# Patient Record
Sex: FEMALE | Race: WHITE | NOT HISPANIC OR LATINO | Employment: STUDENT | URBAN - METROPOLITAN AREA
[De-identification: names, ages, dates, MRNs, and addresses within clinical notes are randomized per-mention and may not be internally consistent; named-entity substitution may affect disease eponyms.]

---
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
## 2021-01-28 ENCOUNTER — OFFICE VISIT (OUTPATIENT)
Dept: DERMATOLOGY | Facility: CLINIC | Age: 21
End: 2021-01-28
Payer: COMMERCIAL

## 2021-01-28 VITALS — SYSTOLIC BLOOD PRESSURE: 115 MMHG | HEART RATE: 93 BPM | DIASTOLIC BLOOD PRESSURE: 71 MMHG | RESPIRATION RATE: 16 BRPM

## 2021-01-28 DIAGNOSIS — L91.0 KELOID: Primary | ICD-10-CM

## 2021-01-28 PROCEDURE — 99212 OFFICE O/P EST SF 10 MIN: CPT | Mod: 25 | Performed by: PHYSICIAN ASSISTANT

## 2021-01-28 PROCEDURE — 11900 INJECT SKIN LESIONS </W 7: CPT | Performed by: PHYSICIAN ASSISTANT

## 2021-01-28 NOTE — PROGRESS NOTES
Rafaela Mckeon is an extremely pleasant 20 year old year old female patient here today for spot on right shoulder. Present for years, getting larger. She does have a history of scarring with procedures. Does recall any injury.  Patient has no other skin complaints today.  Remainder of the HPI, Meds, PMH, Allergies, FH, and SH was reviewed in chart.    Past Medical History:   Diagnosis Date     Foreign-Danlos syndrome      Postural orthostatic tachycardia syndrome        Past Surgical History:   Procedure Laterality Date     CHOLECYSTECTOMY          Family History   Problem Relation Age of Onset     Allergies Mother         Seasonal     Allergies Sister         Seasonal       Social History     Socioeconomic History     Marital status: Single     Spouse name: Not on file     Number of children: Not on file     Years of education: Not on file     Highest education level: Not on file   Occupational History     Not on file   Social Needs     Financial resource strain: Not on file     Food insecurity     Worry: Not on file     Inability: Not on file     Transportation needs     Medical: Not on file     Non-medical: Not on file   Tobacco Use     Smoking status: Never Smoker     Smokeless tobacco: Never Used   Substance and Sexual Activity     Alcohol use: No     Drug use: No     Sexual activity: Never   Lifestyle     Physical activity     Days per week: Not on file     Minutes per session: Not on file     Stress: Not on file   Relationships     Social connections     Talks on phone: Not on file     Gets together: Not on file     Attends Hoahaoism service: Not on file     Active member of club or organization: Not on file     Attends meetings of clubs or organizations: Not on file     Relationship status: Not on file     Intimate partner violence     Fear of current or ex partner: Not on file     Emotionally abused: Not on file     Physically abused: Not on file     Forced sexual activity: Not on file   Other Topics  Concern     Not on file   Social History Narrative    January 7, 2020    ENVIRONMENTAL HISTORY: [place] The home is heated with a electric furnace. They do not have central air conditioning. The patient's bedroom is furnished with carpeting in bedroom and fabric window coverings.  Pets inside the house include cats, dog and rabbits. There is no history of cockroach or mice infestation. There are no smokers in the house.  The house does not have a damp basement.        Outpatient Encounter Medications as of 1/28/2021   Medication Sig Dispense Refill     albuterol (PROAIR HFA/PROVENTIL HFA/VENTOLIN HFA) 108 (90 Base) MCG/ACT inhaler Inhale 2-4 puffs into the lungs every 4 hours as needed for shortness of breath / dyspnea or wheezing 18 g 3     benzoyl peroxide 5 % external liquid Use daily as directed, wash face daily 113 g 11     doxycycline monohydrate (MONODOX) 100 MG capsule Take 1 capsule (100 mg) by mouth daily 60 capsule 3     etonogestrel (NEXPLANON) 68 MG IMPL Nexplanon 68 mg subdermal implant   Inject 1 implant by subcutaneous route.       fluocinonide (LIDEX) 0.05 % external solution Apply topically 2 times daily 120 mL 11     gabapentin (NEURONTIN) 300 MG capsule Take 300 mg by mouth 2 times daily        hydrOXYzine (VISTARIL) 25 MG capsule Take 25 mg by mouth 3 times daily as needed for anxiety        ketorolac (TORADOL) 10 MG tablet Take 10 mg by mouth every 6 hours as needed for pain        ondansetron (ZOFRAN-ODT) 4 MG ODT tab Take 4 mg by mouth       prazosin (MINIPRESS) 5 MG capsule        QUEtiapine (SEROQUEL XR) 50 MG TB24 24 hr tablet quetiapine ER 50 mg tablet,extended release 24 hr       tretinoin (RETIN-A) 0.05 % external cream Apply topically At Bedtime 45 g 3     venlafaxine (EFFEXOR) 37.5 MG tablet Take 225 mg by mouth daily        azelastine (ASTELIN) 0.1 % nasal spray Spray 2 sprays into both nostrils 2 times daily as needed for rhinitis  (Patient not taking: Reported on 6/9/2020) 30 mL 3     azelastine (OPTIVAR) 0.05 % ophthalmic solution Apply 1 drop to eye 2 times daily as needed (itchy/watery eyes) (Patient not taking: Reported on 6/9/2020) 6 mL 3     cyclobenzaprine 5 MG PO tablet Take 1 tablet (5 mg) by mouth 2 times daily as needed for muscle spasms (Patient not taking: Reported on 6/9/2020) 45 tablet 0     fluticasone (ARNUITY ELLIPTA) 100 MCG/ACT inhaler Inhale 1 puff into the lungs daily (Patient not taking: Reported on 6/9/2020) 30 each 3     fluticasone (FLONASE) 50 MCG/ACT nasal spray Spray 1-2 sprays into both nostrils daily (Patient not taking: Reported on 6/9/2020) 16 g 3     ORDER FOR DME Equipment being ordered: Wrist brace Left (Patient not taking: Reported on 2/21/2020) 1 Units 0     oxyCODONE-acetaminophen (PERCOCET) 5-325 MG tablet Take 1-2 tablets by mouth every 4 hours as needed for breakthrough pain (Patient not taking: Reported on 9/23/2020) 4 tablet 0     No facility-administered encounter medications on file as of 1/28/2021.              Review Of Systems  Skin: As above  Eyes: negative  Ears/Nose/Throat: negative  Respiratory: No shortness of breath, dyspnea on exertion, cough      O:   NAD, WDWN, Alert & Oriented, Mood & Affect wnl, Vitals stable   Here today alone   /71 (BP Location: Left arm, Patient Position: Sitting, Cuff Size: Adult Regular)   Pulse 93   Resp 16    General appearance normal   Vitals stable   Alert, oriented and in no acute distress     Firm spongy pink nodule on right posterior shoulder   Keloids from previous scars on abdomen       Eyes: Conjunctivae/lids:Normal     ENT: Lips: normal    MSK:Normal    Cardiovascular: peripheral edema none    Pulm: Breathing Normal    Lymph Nodes: No Head and Neck Lymphadenopathy     Neuro/Psych: Orientation:Alert and Orientedx3 ; Mood/Affect:normal   A/P:  1. Consistent with keloid  IL TAC: PGACAC discussed.  Risks including but not limited to injection site  reaction, bruising, no resolution.  All questions answered and entertained to patient s satisfaction.  Informed consent obtained.  IL TAC in concentration of 20mg/ml was injected ID to keloid.  Total injected was 0.1 ml.  Patient tolerated without complications and given wound care instructions, including not to move product around.  Return in 4 weeks for follow-up and possible additional IL TAC.    If not improving, see Dr. Mary to see if excision is warranted.

## 2021-01-28 NOTE — LETTER
1/28/2021         RE: Rafaela Mckeon  [id number] [place] [address]        Dear Colleague,    Thank you for referring your patient, Rafaela Mckeon, to the [facility]. Please see a copy of my visit note below.    Rafaela Mckeon is an extremely pleasant 20 year old year old female patient here today for spot on right shoulder. Present for years, getting larger. She does have a history of scarring with procedures. Does recall any injury.  Patient has no other skin complaints today.  Remainder of the HPI, Meds, PMH, Allergies, FH, and SH was reviewed in chart.    Past Medical History:   Diagnosis Date     Foreign-Danlos syndrome      Postural orthostatic tachycardia syndrome        Past Surgical History:   Procedure Laterality Date     CHOLECYSTECTOMY          Family History   Problem Relation Age of Onset     Allergies Mother         Seasonal     Allergies Sister         Seasonal       Social History     Socioeconomic History     Marital status: Single     Spouse name: Not on file     Number of children: Not on file     Years of education: Not on file     Highest education level: Not on file   Occupational History     Not on file   Social Needs     Financial resource strain: Not on file     Food insecurity     Worry: Not on file     Inability: Not on file     Transportation needs     Medical: Not on file     Non-medical: Not on file   Tobacco Use     Smoking status: Never Smoker     Smokeless tobacco: Never Used   Substance and Sexual Activity     Alcohol use: No     Drug use: No     Sexual activity: Never   Lifestyle     Physical activity     Days per week: Not on file     Minutes per session: Not on file     Stress: Not on file   Relationships     Social connections     Talks on phone: Not on file     Gets together: Not on file     Attends Restorationist service: Not on file     Active member of club or organization: Not on file     Attends meetings of clubs or organizations: Not on file      Relationship status: Not on file     Intimate partner violence     Fear of current or ex partner: Not on file     Emotionally abused: Not on file     Physically abused: Not on file     Forced sexual activity: Not on file   Other Topics Concern     Not on file   Social History Narrative    January 7, 2020    ENVIRONMENTAL HISTORY: [place] home is heated with a electric furnace. They do not have central air conditioning. The patient's bedroom is furnished with carpeting in bedroom and fabric window coverings.  Pets inside the house include cats, dog and rabbits. There is no history of cockroach or mice infestation. There are no smokers in the house.  The house does not have a damp basement.        Outpatient Encounter Medications as of 1/28/2021   Medication Sig Dispense Refill     albuterol (PROAIR HFA/PROVENTIL HFA/VENTOLIN HFA) 108 (90 Base) MCG/ACT inhaler Inhale 2-4 puffs into the lungs every 4 hours as needed for shortness of breath / dyspnea or wheezing 18 g 3     benzoyl peroxide 5 % external liquid Use daily as directed, wash face daily 113 g 11     doxycycline monohydrate (MONODOX) 100 MG capsule Take 1 capsule (100 mg) by mouth daily 60 capsule 3     etonogestrel (NEXPLANON) 68 MG IMPL Nexplanon 68 mg subdermal implant   Inject 1 implant by subcutaneous route.       fluocinonide (LIDEX) 0.05 % external solution Apply topically 2 times daily 120 mL 11     gabapentin (NEURONTIN) 300 MG capsule Take 300 mg by mouth 2 times daily        hydrOXYzine (VISTARIL) 25 MG capsule Take 25 mg by mouth 3 times daily as needed for anxiety        ketorolac (TORADOL) 10 MG tablet Take 10 mg by mouth every 6 hours as needed for pain        ondansetron (ZOFRAN-ODT) 4 MG ODT tab Take 4 mg by mouth       prazosin (MINIPRESS) 5 MG capsule        QUEtiapine (SEROQUEL XR) 50 MG TB24 24 hr tablet quetiapine ER 50 mg tablet,extended release 24 hr       tretinoin (RETIN-A) 0.05 %  external cream Apply topically At Bedtime 45 g 3     venlafaxine (EFFEXOR) 37.5 MG tablet Take 225 mg by mouth daily        azelastine (ASTELIN) 0.1 % nasal spray Spray 2 sprays into both nostrils 2 times daily as needed for rhinitis (Patient not taking: Reported on 6/9/2020) 30 mL 3     azelastine (OPTIVAR) 0.05 % ophthalmic solution Apply 1 drop to eye 2 times daily as needed (itchy/watery eyes) (Patient not taking: Reported on 6/9/2020) 6 mL 3     cyclobenzaprine 5 MG PO tablet Take 1 tablet (5 mg) by mouth 2 times daily as needed for muscle spasms (Patient not taking: Reported on 6/9/2020) 45 tablet 0     fluticasone (ARNUITY ELLIPTA) 100 MCG/ACT inhaler Inhale 1 puff into the lungs daily (Patient not taking: Reported on 6/9/2020) 30 each 3     fluticasone (FLONASE) 50 MCG/ACT nasal spray Spray 1-2 sprays into both nostrils daily (Patient not taking: Reported on 6/9/2020) 16 g 3     ORDER FOR DME Equipment being ordered: Wrist brace Left (Patient not taking: Reported on 2/21/2020) 1 Units 0     oxyCODONE-acetaminophen (PERCOCET) 5-325 MG tablet Take 1-2 tablets by mouth every 4 hours as needed for breakthrough pain (Patient not taking: Reported on 9/23/2020) 4 tablet 0     No facility-administered encounter medications on file as of 1/28/2021.              Review Of Systems  Skin: As above  Eyes: negative  Ears/Nose/Throat: negative  Respiratory: No shortness of breath, dyspnea on exertion, cough      O:   NAD, WDWN, Alert & Oriented, Mood & Affect wnl, Vitals stable   Here today alone   /71 (BP Location: Left arm, Patient Position: Sitting, Cuff Size: Adult Regular)   Pulse 93   Resp 16    General appearance normal   Vitals stable   Alert, oriented and in no acute distress     Firm spongy pink nodule on right posterior shoulder   Keloids from previous scars on abdomen       Eyes: Conjunctivae/lids:Normal     ENT: Lips: normal    MSK:Normal    Cardiovascular: peripheral edema none    Pulm: Breathing  Normal    Lymph Nodes: No Head and Neck Lymphadenopathy     Neuro/Psych: Orientation:Alert and Orientedx3 ; Mood/Affect:normal   A/P:  1. Consistent with keloid  IL TAC: PGACAC discussed.  Risks including but not limited to injection site reaction, bruising, no resolution.  All questions answered and entertained to patient s satisfaction.  Informed consent obtained.  IL TAC in concentration of 20mg/ml was injected ID to keloid.  Total injected was 0.1 ml.  Patient tolerated without complications and given wound care instructions, including not to move product around.  Return in 4 weeks for follow-up and possible additional IL TAC.    If not improving, see Dr. Mary to see if excision is warranted.       Again, thank you for allowing me to participate in the care of your patient.        Sincerely,        Marianela Rasmussen PA-C

## 2021-01-28 NOTE — NURSING NOTE
"Initial /71 (BP Location: Left arm, Patient Position: Sitting, Cuff Size: Adult Regular)   Pulse 93   Resp 16  Estimated body mass index is 20.25 kg/m  as calculated from the following:    Height as of 12/14/20: 1.626 m (5' 4\").    Weight as of 10/24/20: 53.5 kg (118 lb). .    Rosamaria Gonsales, [facility]    "

## 2021-02-10 NOTE — PROGRESS NOTES
Outpatient Physical Therapy Discharge Note     Patient: Rafaela Mckeon  : 2000    Beginning/End Dates of Reporting Period:  20 to 12/15/20    Referring Provider: Thor Stone Diagnosis: decreased ROM and strength L ankle      Client Self Report: had her f/u with the doctor and said she can wean out of the brace. Has not been wearing the brace inside and it's been feeling pretty good. Not too much for soreness anymore but does feel weak still.     Objective Measurements:  Objective Measure: ankle ROM   Details: 10-65 no pain, mild pain at end range inversion over ATFL     Goals:  Goal Identifier 1   Goal Description Patient will improve left ankle DF to 5 degrees in order to help normalize gait pattern.    Target Date 20   Date Met      Progress:     Goal Identifier 2   Goal Description patient will be able to ambulate with non antalgic gait pattern on the left.    Target Date 20   Date Met      Progress:     Goal Identifier 3   Goal Description Patient will be able to complete SL stance x 30 seconds on the left.    Target Date 21   Date Met      Progress:     Goal Identifier 4   Goal Description Patient will be able to walk for 30 minutes without increased left ankle pain.    Target Date 21   Date Met      Progress:     Goal Identifier 5   Goal Description Patient will be able to complete 10 single leg heel raises on left in order to show improved strength for daily activities and work.    Target Date 21   Date Met      Progress:       Progress towards Goals:   Progress this reporting period: All information listed above was at patient's last attended PT visit. At her last attended session, patient was Progressing to higher level of repetitions and blue band today. Okayed by doctor to take brace off for walking now. Still feels unsteady and weak with balance exercises.  Despite improvements, pt has failed to schedule f/u visits within 30 days from last visit  thus is being d/c from therapy at this time. Objective measures are all taken from last visit. Current status is unknown at this time.    Plan:  Discharge from therapy.    Discharge:    Reason for Discharge: Patient has failed to schedule further appointments.    Equipment Issued: none    Discharge Plan:  Not completed since patient failed to schedule further appointments.    Savannah Renteria  PT, DPT       2/10/2021   [address] [address]  [email or web address]  Voicemail: [phone]

## 2021-06-18 ENCOUNTER — OFFICE VISIT (OUTPATIENT)
Dept: DERMATOLOGY | Facility: CLINIC | Age: 21
End: 2021-06-18
Payer: COMMERCIAL

## 2021-06-18 VITALS — HEART RATE: 94 BPM | DIASTOLIC BLOOD PRESSURE: 67 MMHG | SYSTOLIC BLOOD PRESSURE: 110 MMHG | OXYGEN SATURATION: 98 %

## 2021-06-18 DIAGNOSIS — L70.0 ACNE VULGARIS: ICD-10-CM

## 2021-06-18 DIAGNOSIS — L73.8 PITYROSPORUM FOLLICULITIS: Primary | ICD-10-CM

## 2021-06-18 DIAGNOSIS — L21.9 DERMATITIS, SEBORRHEIC: ICD-10-CM

## 2021-06-18 PROCEDURE — 99213 OFFICE O/P EST LOW 20 MIN: CPT | Performed by: PHYSICIAN ASSISTANT

## 2021-06-18 RX ORDER — CICLOPIROX OLAMINE 7.7 MG/G
CREAM TOPICAL
Qty: 30 G | Refills: 5 | Status: SHIPPED | OUTPATIENT
Start: 2021-06-18 | End: 2024-04-08

## 2021-06-18 RX ORDER — DOXYCYCLINE 100 MG/1
100 CAPSULE ORAL DAILY
Qty: 60 CAPSULE | Refills: 0 | Status: ON HOLD | OUTPATIENT
Start: 2021-06-18 | End: 2021-09-15

## 2021-06-18 NOTE — LETTER
6/18/2021         RE: Rafaela Mckeon  [address] [address]        Dear Colleague,    Thank you for referring your patient, Rafaela Mckeon, to the [facility]. Please see a copy of my visit note below.    Rafaela Mckeon is an extremely pleasant 20 year old year old female patient here today for recheck and and seborrheic dermatitis. She notes acne has improved on doxycycline, bpo wash. She notes that it just recently flared, smith small little bumps on shoulders, face. She notes seborrheic dermatitis has improved on scalp with lidex solution. Patient has no other skin complaints today.  Remainder of the HPI, Meds, PMH, Allergies, FH, and SH was reviewed in chart.    Past Medical History:   Diagnosis Date     Foreign-Danlos syndrome      Postural orthostatic tachycardia syndrome        Past Surgical History:   Procedure Laterality Date     CHOLECYSTECTOMY          Family History   Problem Relation Age of Onset     Allergies Mother         Seasonal     Allergies Sister         Seasonal       Social History     Socioeconomic History     Marital status: Single     Spouse name: Not on file     Number of children: Not on file     Years of education: Not on file     Highest education level: Not on file   Occupational History     Not on file   Social Needs     Financial resource strain: Not on file     Food insecurity     Worry: Not on file     Inability: Not on file     Transportation needs     Medical: Not on file     Non-medical: Not on file   Tobacco Use     Smoking status: Never Smoker     Smokeless tobacco: Never Used   Substance and Sexual Activity     Alcohol use: No     Drug use: No     Sexual activity: Never   Lifestyle     Physical activity     Days per week: Not on file     Minutes per session: Not on file     Stress: Not on file   Relationships     Social connections     Talks on phone: Not on file     Gets together: Not on file     Attends Confucianist service: Not on file      Active member of club or organization: Not on file     Attends meetings of clubs or organizations: Not on file     Relationship status: Not on file     Intimate partner violence     Fear of current or ex partner: Not on file     Emotionally abused: Not on file     Physically abused: Not on file     Forced sexual activity: Not on file   Other Topics Concern     Not on file   Social History Narrative    January 7, 2020    ENVIRONMENTAL HISTORY: [place] The home is heated with a electric furnace. They do not have central air conditioning. The patient's bedroom is furnished with carpeting in bedroom and fabric window coverings.  Pets inside the house include cats, dog and rabbits. There is no history of cockroach or mice infestation. There are no smokers in the house.  The house does not have a damp basement.        Outpatient Encounter Medications as of 6/18/2021   Medication Sig Dispense Refill     albuterol (PROAIR HFA/PROVENTIL HFA/VENTOLIN HFA) 108 (90 Base) MCG/ACT inhaler Inhale 2-4 puffs into the lungs every 4 hours as needed for shortness of breath / dyspnea or wheezing 18 g 3     ciclopirox (LOPROX) 0.77 % cream Apply daily to face. 30 g 5     doxycycline monohydrate (MONODOX) 100 MG capsule Take 1 capsule (100 mg) by mouth daily 60 capsule 0     etonogestrel (NEXPLANON) 68 MG IMPL Nexplanon 68 mg subdermal implant   Inject 1 implant by subcutaneous route.       fluocinonide (LIDEX) 0.05 % external solution Apply topically 2 times daily 120 mL 11     gabapentin (NEURONTIN) 300 MG capsule Take 300 mg by mouth 2 times daily        hydrOXYzine (VISTARIL) 25 MG capsule Take 25 mg by mouth 3 times daily as needed for anxiety        prazosin (MINIPRESS) 5 MG capsule        QUEtiapine (SEROQUEL XR) 50 MG TB24 24 hr tablet quetiapine ER 50 mg tablet,extended release 24 hr       tretinoin (RETIN-A) 0.05 % external cream Apply topically At Bedtime 45 g 3     venlafaxine  (EFFEXOR) 37.5 MG tablet Take 225 mg by mouth daily        azelastine (ASTELIN) 0.1 % nasal spray Spray 2 sprays into both nostrils 2 times daily as needed for rhinitis (Patient not taking: Reported on 6/9/2020) 30 mL 3     azelastine (OPTIVAR) 0.05 % ophthalmic solution Apply 1 drop to eye 2 times daily as needed (itchy/watery eyes) (Patient not taking: Reported on 6/9/2020) 6 mL 3     benzoyl peroxide 5 % external liquid Use daily as directed, wash face daily (Patient not taking: Reported on 6/18/2021) 113 g 11     cyclobenzaprine 5 MG PO tablet Take 1 tablet (5 mg) by mouth 2 times daily as needed for muscle spasms (Patient not taking: Reported on 6/9/2020) 45 tablet 0     fluticasone (ARNUITY ELLIPTA) 100 MCG/ACT inhaler Inhale 1 puff into the lungs daily (Patient not taking: Reported on 6/9/2020) 30 each 3     fluticasone (FLONASE) 50 MCG/ACT nasal spray Spray 1-2 sprays into both nostrils daily (Patient not taking: Reported on 6/9/2020) 16 g 3     ketorolac (TORADOL) 10 MG tablet Take 10 mg by mouth every 6 hours as needed for pain        ondansetron (ZOFRAN-ODT) 4 MG ODT tab Take 4 mg by mouth       [DISCONTINUED] doxycycline monohydrate (MONODOX) 100 MG capsule Take 1 capsule (100 mg) by mouth daily 60 capsule 3     [DISCONTINUED] ORDER FOR DME Equipment being ordered: Wrist brace Left (Patient not taking: Reported on 2/21/2020) 1 Units 0     [DISCONTINUED] oxyCODONE-acetaminophen (PERCOCET) 5-325 MG tablet Take 1-2 tablets by mouth every 4 hours as needed for breakthrough pain (Patient not taking: Reported on 9/23/2020) 4 tablet 0     No facility-administered encounter medications on file as of 6/18/2021.              O:   NAD, WDWN, Alert & Oriented, Mood & Affect wnl, Vitals stable   Here today alone   /67 (BP Location: Right arm, Patient Position: Sitting, Cuff Size: Adult Regular)   Pulse 94   SpO2 98%    General appearance normal   Vitals stable   Alert, oriented and in no acute  distress     Monomorphic inflammatory papules, pustules on forehead       Eyes: Conjunctivae/lids:Normal     ENT: Lips: normal    MSK:Normal    Pulm: Breathing Normal    Neuro/Psych: Orientation:Alert and Orientedx3 ; Mood/Affect:normal  A/P  1.  Acne Vulgaris  Continue bpo wash.   Doxycycline daily with food for next two months, then try to stop.   Apply tretinoin in the evening.   2. Pityrosporum folliculitis   Apply ciclopirox cream daily in the morning to face.   4. Seborrheic dermatitis   Continue lidex if needed.       Again, thank you for allowing me to participate in the care of your patient.        Sincerely,        Marianela Rasmussen PA-C

## 2021-06-18 NOTE — NURSING NOTE
Chief Complaint   Patient presents with     Acne     f/u        Vitals:    06/18/21 1136   BP: 110/67   BP Location: Right arm   Patient Position: Sitting   Cuff Size: Adult Regular   Pulse: 94   SpO2: 98%     Wt Readings from Last 1 Encounters:   10/24/20 53.5 kg (118 lb) (32 %, Z= -0.46)*     * Growth percentiles are based on CDC (Girls, 2-20 Years) data.       Francisca Martins LPN .................6/18/2021

## 2021-06-21 NOTE — PROGRESS NOTES
Rafaela Mckeon is an extremely pleasant 20 year old year old female patient here today for recheck and and seborrheic dermatitis. She notes acne has improved on doxycycline, bpo wash. She notes that it just recently flared, smith small little bumps on shoulders, face. She notes seborrheic dermatitis has improved on scalp with lidex solution. Patient has no other skin complaints today.  Remainder of the HPI, Meds, PMH, Allergies, FH, and SH was reviewed in chart.    Past Medical History:   Diagnosis Date     Foreign-Danlos syndrome      Postural orthostatic tachycardia syndrome        Past Surgical History:   Procedure Laterality Date     CHOLECYSTECTOMY          Family History   Problem Relation Age of Onset     Allergies Mother         Seasonal     Allergies Sister         Seasonal       Social History     Socioeconomic History     Marital status: Single     Spouse name: Not on file     Number of children: Not on file     Years of education: Not on file     Highest education level: Not on file   Occupational History     Not on file   Social Needs     Financial resource strain: Not on file     Food insecurity     Worry: Not on file     Inability: Not on file     Transportation needs     Medical: Not on file     Non-medical: Not on file   Tobacco Use     Smoking status: Never Smoker     Smokeless tobacco: Never Used   Substance and Sexual Activity     Alcohol use: No     Drug use: No     Sexual activity: Never   Lifestyle     Physical activity     Days per week: Not on file     Minutes per session: Not on file     Stress: Not on file   Relationships     Social connections     Talks on phone: Not on file     Gets together: Not on file     Attends Shinto service: Not on file     Active member of club or organization: Not on file     Attends meetings of clubs or organizations: Not on file     Relationship status: Not on file     Intimate partner violence     Fear of current or ex partner: Not on file     Emotionally  abused: Not on file     Physically abused: Not on file     Forced sexual activity: Not on file   Other Topics Concern     Not on file   Social History Narrative    January 7, 2020    ENVIRONMENTAL HISTORY: [place] The home is heated with a electric furnace. They do not have central air conditioning. The patient's bedroom is furnished with carpeting in bedroom and fabric window coverings.  Pets inside the house include cats, dog and rabbits. There is no history of cockroach or mice infestation. There are no smokers in the house.  The house does not have a damp basement.        Outpatient Encounter Medications as of 6/18/2021   Medication Sig Dispense Refill     albuterol (PROAIR HFA/PROVENTIL HFA/VENTOLIN HFA) 108 (90 Base) MCG/ACT inhaler Inhale 2-4 puffs into the lungs every 4 hours as needed for shortness of breath / dyspnea or wheezing 18 g 3     ciclopirox (LOPROX) 0.77 % cream Apply daily to face. 30 g 5     doxycycline monohydrate (MONODOX) 100 MG capsule Take 1 capsule (100 mg) by mouth daily 60 capsule 0     etonogestrel (NEXPLANON) 68 MG IMPL Nexplanon 68 mg subdermal implant   Inject 1 implant by subcutaneous route.       fluocinonide (LIDEX) 0.05 % external solution Apply topically 2 times daily 120 mL 11     gabapentin (NEURONTIN) 300 MG capsule Take 300 mg by mouth 2 times daily        hydrOXYzine (VISTARIL) 25 MG capsule Take 25 mg by mouth 3 times daily as needed for anxiety        prazosin (MINIPRESS) 5 MG capsule        QUEtiapine (SEROQUEL XR) 50 MG TB24 24 hr tablet quetiapine ER 50 mg tablet,extended release 24 hr       tretinoin (RETIN-A) 0.05 % external cream Apply topically At Bedtime 45 g 3     venlafaxine (EFFEXOR) 37.5 MG tablet Take 225 mg by mouth daily        azelastine (ASTELIN) 0.1 % nasal spray Spray 2 sprays into both nostrils 2 times daily as needed for rhinitis (Patient not taking: Reported on 6/9/2020) 30 mL 3     azelastine (OPTIVAR)  0.05 % ophthalmic solution Apply 1 drop to eye 2 times daily as needed (itchy/watery eyes) (Patient not taking: Reported on 6/9/2020) 6 mL 3     benzoyl peroxide 5 % external liquid Use daily as directed, wash face daily (Patient not taking: Reported on 6/18/2021) 113 g 11     cyclobenzaprine 5 MG PO tablet Take 1 tablet (5 mg) by mouth 2 times daily as needed for muscle spasms (Patient not taking: Reported on 6/9/2020) 45 tablet 0     fluticasone (ARNUITY ELLIPTA) 100 MCG/ACT inhaler Inhale 1 puff into the lungs daily (Patient not taking: Reported on 6/9/2020) 30 each 3     fluticasone (FLONASE) 50 MCG/ACT nasal spray Spray 1-2 sprays into both nostrils daily (Patient not taking: Reported on 6/9/2020) 16 g 3     ketorolac (TORADOL) 10 MG tablet Take 10 mg by mouth every 6 hours as needed for pain        ondansetron (ZOFRAN-ODT) 4 MG ODT tab Take 4 mg by mouth       [DISCONTINUED] doxycycline monohydrate (MONODOX) 100 MG capsule Take 1 capsule (100 mg) by mouth daily 60 capsule 3     [DISCONTINUED] ORDER FOR DME Equipment being ordered: Wrist brace Left (Patient not taking: Reported on 2/21/2020) 1 Units 0     [DISCONTINUED] oxyCODONE-acetaminophen (PERCOCET) 5-325 MG tablet Take 1-2 tablets by mouth every 4 hours as needed for breakthrough pain (Patient not taking: Reported on 9/23/2020) 4 tablet 0     No facility-administered encounter medications on file as of 6/18/2021.              O:   NAD, WDWN, Alert & Oriented, Mood & Affect wnl, Vitals stable   Here today alone   /67 (BP Location: Right arm, Patient Position: Sitting, Cuff Size: Adult Regular)   Pulse 94   SpO2 98%    General appearance normal   Vitals stable   Alert, oriented and in no acute distress     Monomorphic inflammatory papules, pustules on forehead       Eyes: Conjunctivae/lids:Normal     ENT: Lips: normal    MSK:Normal    Pulm: Breathing Normal    Neuro/Psych: Orientation:Alert and Orientedx3 ; Mood/Affect:normal  A/P  1.  Acne  Vulgaris  Continue bpo wash.   Doxycycline daily with food for next two months, then try to stop.   Apply tretinoin in the evening.   2. Pityrosporum folliculitis   Apply ciclopirox cream daily in the morning to face.   4. Seborrheic dermatitis   Continue lidex if needed.

## 2021-06-28 ENCOUNTER — HOSPITAL ENCOUNTER (EMERGENCY)
Facility: CLINIC | Age: 21
Discharge: HOME | End: 2021-06-28
Attending: PHYSICIAN ASSISTANT | Admitting: PHYSICIAN ASSISTANT
Payer: COMMERCIAL

## 2021-06-28 VITALS
HEIGHT: 64 IN | SYSTOLIC BLOOD PRESSURE: 121 MMHG | DIASTOLIC BLOOD PRESSURE: 88 MMHG | HEART RATE: 89 BPM | TEMPERATURE: 98.2 F | RESPIRATION RATE: 20 BRPM | OXYGEN SATURATION: 100 % | WEIGHT: 118 LBS | BODY MASS INDEX: 20.14 KG/M2

## 2021-06-28 DIAGNOSIS — T23.262A: ICD-10-CM

## 2021-06-28 DIAGNOSIS — T23.162A SUPERFICIAL BURN OF BACK OF LEFT HAND, INITIAL ENCOUNTER: ICD-10-CM

## 2021-06-28 PROCEDURE — 99213 OFFICE O/P EST LOW 20 MIN: CPT | Performed by: PHYSICIAN ASSISTANT

## 2021-06-28 PROCEDURE — 16020 DRESS/DEBRID P-THICK BURN S: CPT | Performed by: PHYSICIAN ASSISTANT

## 2021-06-28 PROCEDURE — G0463 HOSPITAL OUTPT CLINIC VISIT: HCPCS | Mod: 25 | Performed by: PHYSICIAN ASSISTANT

## 2021-06-28 RX ORDER — MUPIROCIN 20 MG/G
OINTMENT TOPICAL 3 TIMES DAILY
Qty: 22 G | Refills: 0 | Status: SHIPPED | OUTPATIENT
Start: 2021-06-28 | End: 2021-07-08

## 2021-06-28 ASSESSMENT — ENCOUNTER SYMPTOMS
COLOR CHANGE: 1
CARDIOVASCULAR NEGATIVE: 1
RESPIRATORY NEGATIVE: 1
GASTROINTESTINAL NEGATIVE: 1
CONSTITUTIONAL NEGATIVE: 1
WOUND: 1

## 2021-06-28 ASSESSMENT — MIFFLIN-ST. JEOR: SCORE: 1295.24

## 2021-06-28 NOTE — ED PROVIDER NOTES
History     Chief Complaint   Patient presents with     Burn     spilt boiling water on her left arm, no blistering, but is red     HPI  Rafaela Mckeon is a 20 year old female who is presenting with a burn on her left wrist and hand, caused by spilling boiling water on it this afternoon. Currently does not have blistering but has redness at the burn site. The burn does not extend circumferentially.  Burn site is located on the back of the wrist and left hand and around the radial aspect of the wrist as well as on the ring finger.The patient initially rinsed her left arm/hand under lukewarm water for about 30 minutes prior to coming to clinic today.  She also taken Advil with limited relief of pain.  However, applying ice to the affected area has offered moderate relief of pain.  No previous injuries or surgeries to the left hand.  Sensation is are normal and the left upper extremity.  However she has some difficulty clenching her fist due to discomfort in the wrist.    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     Priority: Medium     Severe recurrent major depression without psychotic features (H) 03/02/2019     Priority: Medium     Abnormal hearing screen 10/09/2018     Priority: Medium     Suicidal ideation 02/02/2018     Priority: Medium     Generalized anxiety disorder 09/28/2017     Priority: Medium        Past Medical History:    Past Medical History:   Diagnosis Date     Foreign-Danlos syndrome      Postural orthostatic tachycardia syndrome        Past Surgical History:    Past Surgical History:   Procedure Laterality Date     CHOLECYSTECTOMY         Family History:    Family History   Problem  "Relation Age of Onset     Allergies Mother         Seasonal     Allergies Sister         Seasonal       Social History:  Marital Status:  Single [1]  Social History     Tobacco Use     Smoking status: Never Smoker     Smokeless tobacco: Never Used   Substance Use Topics     Alcohol use: No     Drug use: No        Medications:    albuterol (PROAIR HFA/PROVENTIL HFA/VENTOLIN HFA) 108 (90 Base) MCG/ACT inhaler  azelastine (ASTELIN) 0.1 % nasal spray  azelastine (OPTIVAR) 0.05 % ophthalmic solution  benzoyl peroxide 5 % external liquid  ciclopirox (LOPROX) 0.77 % cream  cyclobenzaprine 5 MG PO tablet  doxycycline monohydrate (MONODOX) 100 MG capsule  etonogestrel (NEXPLANON) 68 MG IMPL  fluocinonide (LIDEX) 0.05 % external solution  fluticasone (ARNUITY ELLIPTA) 100 MCG/ACT inhaler  fluticasone (FLONASE) 50 MCG/ACT nasal spray  gabapentin (NEURONTIN) 300 MG capsule  hydrOXYzine (VISTARIL) 25 MG capsule  ketorolac (TORADOL) 10 MG tablet  ondansetron (ZOFRAN-ODT) 4 MG ODT tab  prazosin (MINIPRESS) 5 MG capsule  QUEtiapine (SEROQUEL XR) 50 MG TB24 24 hr tablet  tretinoin (RETIN-A) 0.05 % external cream  venlafaxine (EFFEXOR) 37.5 MG tablet          Review of Systems   Constitutional: Negative.    HENT: Negative.    Respiratory: Negative.    Cardiovascular: Negative.    Gastrointestinal: Negative.    Genitourinary: Negative.    Skin: Positive for color change and wound. Negative for pallor and rash.          Physical Exam   BP: 121/88  Pulse: 89  Temp: 98.2  F (36.8  C)  Resp: 20  Height: 162.6 cm (5' 4\")  Weight: 53.5 kg (118 lb)  SpO2: 100 %      Physical Exam  Constitutional:       General: She is not in acute distress.     Appearance: Normal appearance. She is not ill-appearing, toxic-appearing or diaphoretic.   Cardiovascular:      Pulses: Normal pulses.           Radial pulses are 2+ on the left side.   Musculoskeletal: Normal range of motion.         General: No tenderness, deformity or signs of injury.   Skin:     " General: Skin is warm and dry.      Findings: Burn, erythema and lesion present. No rash.             Comments: Burn is not circumferential.  Pulses and sensation are intact.  Distal strength is intact in the left upper extremity, however the patient is unable to completely flex her fingers/clench her fist due to discomfort in the left wrist.   Neurological:      General: No focal deficit present.      Mental Status: She is alert and oriented to person, place, and time.      Sensory: No sensory deficit.      Motor: No weakness.      Coordination: Coordination normal.   Psychiatric:         Mood and Affect: Mood normal.         Behavior: Behavior normal.         Thought Content: Thought content normal.         Judgment: Judgment normal.         ED Course        Procedures                 No results found for this or any previous visit (from the past 24 hour(s)).    Medications - No data to display    Assessments & Plan (with Medical Decision Making)   The patient is a 20-year-old female presenting to urgent care with a superficial burn on her left wrist and hand which occurred this afternoon.  The burn was caused by accidentally pouring boiling water over the left wrist and hand.  The patient has cared for the injury by rinsing the affected area under lukewarm water for about 30 minutes and taking ibuprofen.  She is also applied ice to the affected area which has offered moderate relief.  No previous injuries or surgeries to the left upper extremity.  The patient does have normal sensation and strength in the left hand.  Last Tdap vaccine was in 2013, the patient is within the 10-year window for recommended Tdap vaccine administration.     Physical exam as above.  There is a superficial thickness burn, second-degree burn, on the dorsal aspect of the left wrist extending around the radial aspect of the wrist with additional small burns on the back of the hand.  Wound was dressed in clinic with bacitracin and Vaseline  impregnated gauze.  May continue to apply ice to the affected area up to 48 hours post injury.     Recommended applying Bactroban to the affected area twice per day with dressing changes once per day.  May use nonstick dressings or Vaseline impregnated dressings.  Allowing the wound exposure to air overnight. Also recommended close follow-up in the next 3 to 4 days to ensure the burn is healing.     Patient currently works as a , I am providing her with a work note due to her injury.    Recommend urgent medical evaluation if the parent patient develops worsening redness/pain/tenderness, numbness/tingling developing in the left hand, or redness/red streaks progressing up the left upper extremity.    I have reviewed the nursing notes.    I have reviewed the findings, diagnosis, plan and need for follow up with the patient.      New Prescriptions    No medications on file       Final diagnoses:   Superficial burn of back of left hand, initial encounter   Second degree burn of back of left hand       6/28/2021   [facility] EMERGENCY DEPT     Jey Lainez PA-C  06/29/21 2390

## 2021-06-28 NOTE — Clinical Note
Rafaela Mckeon was seen and treated in our emergency department on 6/28/2021.  She may return to work on 07/05/2021.  Rafaela was seen and evaluated in my clinic today.  I am recommending that she remain out of work due to medical concerns until July 5, 2021 or until symptoms improve.     If you have any questions or concerns, please don't hesitate to call.      Jey Lainez PA-C

## 2021-06-28 NOTE — ED TRIAGE NOTES
Pt was making dinner and spilled boiling water on her left arm. Redness noted and painful no blistering at this time.

## 2021-09-09 ENCOUNTER — TELEPHONE (OUTPATIENT)
Dept: BEHAVIORAL HEALTH | Facility: CLINIC | Age: 21
End: 2021-09-09

## 2021-09-09 ENCOUNTER — HOSPITAL ENCOUNTER (INPATIENT)
Facility: CLINIC | Age: 21
LOS: 6 days | Discharge: HOME | DRG: 885 | End: 2021-09-15
Attending: PSYCHIATRY & NEUROLOGY | Admitting: PSYCHIATRY & NEUROLOGY
Payer: COMMERCIAL

## 2021-09-09 DIAGNOSIS — R45.851 SUICIDAL INTENT: ICD-10-CM

## 2021-09-09 DIAGNOSIS — F25.0 SCHIZOAFFECTIVE DISORDER, BIPOLAR TYPE (H): ICD-10-CM

## 2021-09-09 DIAGNOSIS — Z11.52 ENCOUNTER FOR SCREENING LABORATORY TESTING FOR SEVERE ACUTE RESPIRATORY SYNDROME CORONAVIRUS 2 (SARS-COV-2): ICD-10-CM

## 2021-09-09 DIAGNOSIS — R45.851 SUICIDAL IDEATION: ICD-10-CM

## 2021-09-09 DIAGNOSIS — F33.2 SEVERE RECURRENT MAJOR DEPRESSION WITHOUT PSYCHOTIC FEATURES (H): Primary | ICD-10-CM

## 2021-09-09 LAB
ALBUMIN SERPL-MCNC: 3.8 G/DL (ref 3.4–5)
ALP SERPL-CCNC: 74 U/L (ref 40–150)
ALT SERPL W P-5'-P-CCNC: 84 U/L (ref 0–50)
AMPHETAMINES UR QL SCN: NORMAL
ANION GAP SERPL CALCULATED.3IONS-SCNC: 1 MMOL/L (ref 3–14)
AST SERPL W P-5'-P-CCNC: 50 U/L (ref 0–45)
BARBITURATES UR QL: NORMAL
BASOPHILS # BLD AUTO: 0 10E3/UL (ref 0–0.2)
BASOPHILS NFR BLD AUTO: 0 %
BENZODIAZ UR QL: NORMAL
BILIRUB SERPL-MCNC: 0.2 MG/DL (ref 0.2–1.3)
BUN SERPL-MCNC: 7 MG/DL (ref 7–30)
CALCIUM SERPL-MCNC: 8.6 MG/DL (ref 8.5–10.1)
CANNABINOIDS UR QL SCN: NORMAL
CHLORIDE BLD-SCNC: 108 MMOL/L (ref 94–109)
CO2 SERPL-SCNC: 30 MMOL/L (ref 20–32)
COCAINE UR QL: NORMAL
CREAT SERPL-MCNC: 0.57 MG/DL (ref 0.52–1.04)
EOSINOPHIL # BLD AUTO: 0 10E3/UL (ref 0–0.7)
EOSINOPHIL NFR BLD AUTO: 1 %
ERYTHROCYTE [DISTWIDTH] IN BLOOD BY AUTOMATED COUNT: 12.6 % (ref 10–15)
GFR SERPL CREATININE-BSD FRML MDRD: >90 ML/MIN/1.73M2
GLUCOSE BLD-MCNC: 81 MG/DL (ref 70–99)
HCG UR QL: NEGATIVE
HCT VFR BLD AUTO: 37.2 % (ref 35–47)
HGB BLD-MCNC: 11.9 G/DL (ref 11.7–15.7)
IMM GRANULOCYTES # BLD: 0 10E3/UL
IMM GRANULOCYTES NFR BLD: 0 %
LYMPHOCYTES # BLD AUTO: 1.9 10E3/UL (ref 0.8–5.3)
LYMPHOCYTES NFR BLD AUTO: 36 %
MCH RBC QN AUTO: 31.5 PG (ref 26.5–33)
MCHC RBC AUTO-ENTMCNC: 32 G/DL (ref 31.5–36.5)
MCV RBC AUTO: 98 FL (ref 78–100)
MONOCYTES # BLD AUTO: 0.4 10E3/UL (ref 0–1.3)
MONOCYTES NFR BLD AUTO: 8 %
NEUTROPHILS # BLD AUTO: 2.8 10E3/UL (ref 1.6–8.3)
NEUTROPHILS NFR BLD AUTO: 55 %
NRBC # BLD AUTO: 0 10E3/UL
NRBC BLD AUTO-RTO: 0 /100
OPIATES UR QL SCN: NORMAL
PLATELET # BLD AUTO: 244 10E3/UL (ref 150–450)
POTASSIUM BLD-SCNC: 3.6 MMOL/L (ref 3.4–5.3)
PROT SERPL-MCNC: 7 G/DL (ref 6.8–8.8)
RBC # BLD AUTO: 3.78 10E6/UL (ref 3.8–5.2)
SARS-COV-2 RNA RESP QL NAA+PROBE: NEGATIVE
SODIUM SERPL-SCNC: 139 MMOL/L (ref 133–144)
WBC # BLD AUTO: 5.2 10E3/UL (ref 4–11)

## 2021-09-09 PROCEDURE — 99284 EMERGENCY DEPT VISIT MOD MDM: CPT | Performed by: PSYCHIATRY & NEUROLOGY

## 2021-09-09 PROCEDURE — 80307 DRUG TEST PRSMV CHEM ANLYZR: CPT | Performed by: PSYCHIATRY & NEUROLOGY

## 2021-09-09 PROCEDURE — 99285 EMERGENCY DEPT VISIT HI MDM: CPT | Performed by: PSYCHIATRY & NEUROLOGY

## 2021-09-09 PROCEDURE — U0003 INFECTIOUS AGENT DETECTION BY NUCLEIC ACID (DNA OR RNA); SEVERE ACUTE RESPIRATORY SYNDROME CORONAVIRUS 2 (SARS-COV-2) (CORONAVIRUS DISEASE [COVID-19]), AMPLIFIED PROBE TECHNIQUE, MAKING USE OF HIGH THROUGHPUT TECHNOLOGIES AS DESCRIBED BY CMS-2020-01-R: HCPCS | Performed by: PSYCHIATRY & NEUROLOGY

## 2021-09-09 PROCEDURE — 250N000013 HC RX MED GY IP 250 OP 250 PS 637: Performed by: PSYCHIATRY & NEUROLOGY

## 2021-09-09 PROCEDURE — C9803 HOPD COVID-19 SPEC COLLECT: HCPCS | Performed by: PSYCHIATRY & NEUROLOGY

## 2021-09-09 PROCEDURE — 85004 AUTOMATED DIFF WBC COUNT: CPT | Performed by: PSYCHIATRY & NEUROLOGY

## 2021-09-09 PROCEDURE — 124N000002 HC R&B MH UMMC

## 2021-09-09 PROCEDURE — 81025 URINE PREGNANCY TEST: CPT | Performed by: PSYCHIATRY & NEUROLOGY

## 2021-09-09 PROCEDURE — 36415 COLL VENOUS BLD VENIPUNCTURE: CPT | Performed by: PSYCHIATRY & NEUROLOGY

## 2021-09-09 PROCEDURE — 80053 COMPREHEN METABOLIC PANEL: CPT | Performed by: PSYCHIATRY & NEUROLOGY

## 2021-09-09 RX ORDER — QUETIAPINE FUMARATE 50 MG/1
50 TABLET, FILM COATED ORAL 2 TIMES DAILY PRN
COMMUNITY
End: 2024-04-08

## 2021-09-09 RX ORDER — POLYETHYLENE GLYCOL 3350 17 G/17G
17 POWDER, FOR SOLUTION ORAL DAILY PRN
Status: DISCONTINUED | OUTPATIENT
Start: 2021-09-09 | End: 2021-09-15 | Stop reason: HOSPADM

## 2021-09-09 RX ORDER — CLONAZEPAM 0.5 MG/1
0.5 TABLET ORAL DAILY
COMMUNITY
Start: 2024-04-01

## 2021-09-09 RX ORDER — NABUMETONE 500 MG/1
500 TABLET, FILM COATED ORAL 2 TIMES DAILY PRN
Status: ON HOLD | COMMUNITY
End: 2021-09-09

## 2021-09-09 RX ORDER — OLANZAPINE 10 MG/2ML
2.5-5 INJECTION, POWDER, FOR SOLUTION INTRAMUSCULAR 3 TIMES DAILY PRN
Status: DISCONTINUED | OUTPATIENT
Start: 2021-09-09 | End: 2021-09-10

## 2021-09-09 RX ORDER — QUETIAPINE 300 MG/1
300 TABLET, FILM COATED, EXTENDED RELEASE ORAL AT BEDTIME
Status: DISCONTINUED | OUTPATIENT
Start: 2021-09-09 | End: 2021-09-10

## 2021-09-09 RX ORDER — HYDROXYZINE HYDROCHLORIDE 25 MG/1
25-50 TABLET, FILM COATED ORAL EVERY 4 HOURS PRN
Status: DISCONTINUED | OUTPATIENT
Start: 2021-09-09 | End: 2021-09-15 | Stop reason: HOSPADM

## 2021-09-09 RX ORDER — ACETAMINOPHEN 325 MG/1
650 TABLET ORAL EVERY 4 HOURS PRN
Status: DISCONTINUED | OUTPATIENT
Start: 2021-09-09 | End: 2021-09-15 | Stop reason: HOSPADM

## 2021-09-09 RX ORDER — ALBUTEROL SULFATE 90 UG/1
2 AEROSOL, METERED RESPIRATORY (INHALATION) EVERY 6 HOURS PRN
Status: DISCONTINUED | OUTPATIENT
Start: 2021-09-09 | End: 2021-09-15 | Stop reason: HOSPADM

## 2021-09-09 RX ORDER — OLANZAPINE 2.5 MG/1
2.5-5 TABLET, FILM COATED ORAL 3 TIMES DAILY PRN
Status: DISCONTINUED | OUTPATIENT
Start: 2021-09-09 | End: 2021-09-10

## 2021-09-09 RX ORDER — MIRTAZAPINE 15 MG/1
15 TABLET, FILM COATED ORAL AT BEDTIME
Status: DISCONTINUED | OUTPATIENT
Start: 2021-09-09 | End: 2021-09-15 | Stop reason: HOSPADM

## 2021-09-09 RX ORDER — MIRTAZAPINE 15 MG/1
TABLET, FILM COATED ORAL
Status: ON HOLD | COMMUNITY
Start: 2021-09-07 | End: 2021-09-09

## 2021-09-09 RX ORDER — MAGNESIUM HYDROXIDE/ALUMINUM HYDROXICE/SIMETHICONE 120; 1200; 1200 MG/30ML; MG/30ML; MG/30ML
30 SUSPENSION ORAL EVERY 4 HOURS PRN
Status: DISCONTINUED | OUTPATIENT
Start: 2021-09-09 | End: 2021-09-15 | Stop reason: HOSPADM

## 2021-09-09 RX ORDER — CLONAZEPAM 0.5 MG/1
0.5 TABLET ORAL 2 TIMES DAILY
Status: DISCONTINUED | OUTPATIENT
Start: 2021-09-09 | End: 2021-09-15 | Stop reason: HOSPADM

## 2021-09-09 RX ORDER — SERTRALINE HYDROCHLORIDE 100 MG/1
100 TABLET, FILM COATED ORAL DAILY
Status: ON HOLD | COMMUNITY
End: 2021-09-09

## 2021-09-09 RX ORDER — SERTRALINE HYDROCHLORIDE 100 MG/1
100 TABLET, FILM COATED ORAL DAILY
Status: DISCONTINUED | OUTPATIENT
Start: 2021-09-10 | End: 2021-09-13

## 2021-09-09 RX ORDER — GABAPENTIN 300 MG/1
300 CAPSULE ORAL AT BEDTIME
Status: DISCONTINUED | OUTPATIENT
Start: 2021-09-09 | End: 2021-09-10

## 2021-09-09 RX ADMIN — MIRTAZAPINE 15 MG: 15 TABLET, FILM COATED ORAL at 21:37

## 2021-09-09 RX ADMIN — GABAPENTIN 300 MG: 300 CAPSULE ORAL at 21:37

## 2021-09-09 RX ADMIN — CLONAZEPAM 0.5 MG: 0.5 TABLET ORAL at 21:37

## 2021-09-09 RX ADMIN — QUETIAPINE FUMARATE 300 MG: 300 TABLET, EXTENDED RELEASE ORAL at 21:37

## 2021-09-09 ASSESSMENT — MIFFLIN-ST. JEOR: SCORE: 1289.34

## 2021-09-09 ASSESSMENT — ACTIVITIES OF DAILY LIVING (ADL)
DIFFICULTY_COMMUNICATING: NO
DIFFICULTY_EATING/SWALLOWING: NO
TOILETING_ISSUES: NO
WEAR_GLASSES_OR_BLIND: YES
PATIENT_/_FAMILY_COMMUNICATION_STYLE: SPOKEN LANGUAGE (ENGLISH OR BILINGUAL)
WALKING_OR_CLIMBING_STAIRS_DIFFICULTY: NO
VISION_MANAGEMENT: GLASSES
CONCENTRATING,_REMEMBERING_OR_MAKING_DECISIONS_DIFFICULTY: NO
DOING_ERRANDS_INDEPENDENTLY_DIFFICULTY: NO
DRESSING/BATHING_DIFFICULTY: NO
FALL_HISTORY_WITHIN_LAST_SIX_MONTHS: NO

## 2021-09-09 ASSESSMENT — ENCOUNTER SYMPTOMS
MUSCULOSKELETAL NEGATIVE: 1
GASTROINTESTINAL NEGATIVE: 1
NERVOUS/ANXIOUS: 1
EYES NEGATIVE: 1
HYPERACTIVE: 0
HALLUCINATIONS: 0
NEUROLOGICAL NEGATIVE: 1
CARDIOVASCULAR NEGATIVE: 1
RESPIRATORY NEGATIVE: 1
DECREASED CONCENTRATION: 1
CONSTITUTIONAL NEGATIVE: 1

## 2021-09-09 NOTE — PROGRESS NOTES
09/09/21 1802   Patient Belongings   Did you bring any home meds/supplements to the hospital?  No   Patient Belongings remains with patient   Patient Belongings Remaining with Patient other (see comments)  (Underwear, sports bra)   Patient Belongings Put in Hospital Secure Location (Security or Locker, etc.) cash/credit card;cell phone/electronics;clothing;plastic bag;purse/wallet;shoes;wallet;other (see comments)   Belongings Search Yes   Clothing Search Yes   Second Staff Manuel LOCKE   Patient Belongings to Security:   $250 dollars cash, $9.54 cents, Red debit card [[id number]],Gray Credit card [[id number]]    Patient Belongings in Locker:   Black hoodie, Black jeans, Black shoes w/laces, Cellphone w/ brown case, black ponytail, white/yellow shirt, phone  cord, dark purple/green purse, black wallet, [place] 's license, Keys, Key chain, lanyard    Medications: **Given to nurse**  Inhaler, 2 pills    Brought in later 9/9/2021:  -shampoo/conditioner  -toothbrush  -face cream  -fluocinonide (given to RN)  -contact lens solution  -glasses  -toothpaste  -book  -brush  -journal  -cards  -chapstick  -deodorant  -underwear  -socks  -sweatpants  -sweatshirt  -shirts  -sports bra  -mask  -stuffed animal    Brought in 9/12/21:  - one book  ..A               Admission:  I am responsible for any personal items that are not sent to the safe or pharmacy.  [facility] is not responsible for loss, theft or damage of any property in my possession.    Signature:  _________________________________ Date: _______  Time: _____                                              Staff Signature:  ____________________________ Date: ________  Time: _____      2nd Staff person, if patient is unable/unwilling to sign:    Signature: ________________________________ Date: ________  Time: _____     Discharge:  [facility] has returned all of my personal belongings:    Signature: _________________________________ Date: ________  Time: _____                                           Staff Signature:  ____________________________ Date: ________  Time: _____

## 2021-09-09 NOTE — PHARMACY-ADMISSION MEDICATION HISTORY
Admission Medication History Completed by Pharmacy    See [facility] Admission Navigator for allergy information, preferred outpatient pharmacy, prior to admission medications and immunization status.     Medication History Sources:     Patient Rafaela Mckeon    Dispense History    Changes made to PTA medication list (reason):    Added: Nabumetone, Quetiapine 50 mg PRN, sertraline    Deleted: duplicates, venlafaxine    Changed: quetiapine scheduled dose to 300 mg    Additional Information:    This prior to admission medication list is accurate and complete to the best of my knowledge based off of the available information at this time.     Prior to Admission medications    Medication Sig Last Dose Taking? Auth Provider   albuterol (PROAIR HFA/PROVENTIL HFA/VENTOLIN HFA) 108 (90 Base) MCG/ACT inhaler Inhale 2-4 puffs into the lungs every 4 hours as needed for shortness of breath / dyspnea or wheezing  at prn Yes Bakari Woods MD   clonazePAM (KLONOPIN) 0.5 MG tablet 0.5 mg 2 times daily  9/9/2021 at Unknown time Yes Reported, Patient   fluocinonide (LIDEX) 0.05 % external solution Apply topically 2 times daily  at prn Yes Gabe Mary MD   gabapentin (NEURONTIN) 300 MG capsule Take 300 mg by mouth At Bedtime  9/8/2021 at Unknown time Yes Reported, Patient   hydrOXYzine (VISTARIL) 25 MG capsule Take 25 mg by mouth 3 times daily as needed for anxiety   at prn Yes Reported, Patient   mirtazapine (REMERON) 15 MG tablet mirtazapine 15 mg tablet 9/8/2021 at Unknown time Yes Reported, Patient   nabumetone (RELAFEN) 500 MG tablet Take 500 mg by mouth 2 times daily as needed for moderate pain Past Month at prn Yes Unknown, Entered By History   prazosin (MINIPRESS) 5 MG capsule   at prn Yes Reported, Patient   QUEtiapine (SEROQUEL) 50 MG tablet Take 50 mg by mouth 2 times daily as needed Past Week at prn Yes Unknown, Entered By History   QUEtiapine ER (SEROQUEL XR) 300 MG 24 hr tablet 300 mg  9/9/2021 at Unknown time  Yes Reported, Patient   sertraline (ZOLOFT) 100 MG tablet Take 100 mg by mouth daily 9/9/2021 at Unknown time Yes Unknown, Entered By History   benzoyl peroxide 5 % external liquid Use daily as directed, wash face daily  Patient not taking: Reported on 6/18/2021   Gabe Mary MD   ciclopirox (LOPROX) 0.77 % cream Apply daily to face.  Patient not taking: Reported on 9/9/2021 Not Taking at Unknown time  Marianela Connelly PA-C   doxycycline monohydrate (MONODOX) 100 MG capsule Take 1 capsule (100 mg) by mouth daily   Marianela Connelly PA-C   etonogestrel (NEXPLANON) 68 MG IMPL Nexplanon 68 mg subdermal implant   Inject 1 implant by subcutaneous route.   Reported, Patient   tretinoin (RETIN-A) 0.05 % external cream Apply topically At Bedtime  Patient not taking: Reported on 9/9/2021 Not Taking at Unknown time  Gabe Mary MD       Date completed: 09/09/21    Medication history completed by: Ric Martínez [facility]

## 2021-09-09 NOTE — ED NOTES
Pt had drug overdose August 22.  Pt was in ICU for 9 days prior to admission on the  unit.  Pt was discharged from  unit on Tuesday 9/7.  Pt states she doesn't feel safe at home due to her own behaviors and SI.

## 2021-09-09 NOTE — ED PROVIDER NOTES
ED Provider Note  [facility]      History     Chief Complaint   Patient presents with     Suicidal     Pt was sent here by her primary provider because she is suicidal     HPI  Rafaela Mckeon is a 21 year old female who is here referred from her primary care clinic in [place], [place] where she had followed up today after being discharged 2 days ago from a recent psychiatric hospital stay in [place] at [facility]. Patient had attempted suicide by overdose of tylenol, aspirin and pain pills and was in the ICU for 9 days before she was transferred to the psych unit where she stayed an additional week. Patient was still feeling depressed and missed being home and asked for discharge 2 days ago, feeling that she can handle being home. She has PHP set up through [facility] for next week. Patient felt she made a mistake of coming home as her depression got worse and she was again having a build-up of her suicidal thinking. She lives at home with mother. She has been thinking of a more lethal method of dying by drinking liquid nicotine for its toxic effect. She feels unsafe being home. Her doctor suggested coming to [facility] and she ended up driving here to get help. She would like to be admitted for safety and stabilization.    Patient denies using drugs. She sees a psychiatrist in the community and been diagnosed with schizoaffective disorder past year. She has tried Abilify previously but developed akathisia. She tried Zyprexa but developed EPSE. She has not had any side effects from Seroquel presently.     Patient has had her COVID vaccine and denies any COVID symptoms. Patient has history of POTS. She has no acute medical concerns presently.    PERSONAL MEDICAL HISTORY  Past Medical History:   Diagnosis Date     Foreign-Danlos syndrome      Postural orthostatic tachycardia syndrome      PAST SURGICAL HISTORY  Past Surgical History:   Procedure Laterality Date     CHOLECYSTECTOMY        FAMILY HISTORY  Family History   Problem Relation Age of Onset     Allergies Mother         Seasonal     Allergies Sister         Seasonal     SOCIAL HISTORY  Social History     Tobacco Use     Smoking status: Never Smoker     Smokeless tobacco: Never Used   Substance Use Topics     Alcohol use: No     MEDICATIONS  No current facility-administered medications for this encounter.     Current Outpatient Medications   Medication     clonazePAM (KLONOPIN) 0.5 MG tablet     gabapentin (NEURONTIN) 300 MG capsule     mirtazapine (REMERON) 15 MG tablet     QUEtiapine (SEROQUEL XR) 50 MG TB24 24 hr tablet     albuterol (PROAIR HFA/PROVENTIL HFA/VENTOLIN HFA) 108 (90 Base) MCG/ACT inhaler     azelastine (ASTELIN) 0.1 % nasal spray     azelastine (OPTIVAR) 0.05 % ophthalmic solution     benzoyl peroxide 5 % external liquid     ciclopirox (LOPROX) 0.77 % cream     cyclobenzaprine 5 MG PO tablet     doxycycline monohydrate (MONODOX) 100 MG capsule     etonogestrel (NEXPLANON) 68 MG IMPL     fluocinonide (LIDEX) 0.05 % external solution     fluticasone (ARNUITY ELLIPTA) 100 MCG/ACT inhaler     fluticasone (FLONASE) 50 MCG/ACT nasal spray     hydrOXYzine (VISTARIL) 25 MG capsule     ketorolac (TORADOL) 10 MG tablet     ondansetron (ZOFRAN-ODT) 4 MG ODT tab     prazosin (MINIPRESS) 5 MG capsule     tretinoin (RETIN-A) 0.05 % external cream     venlafaxine (EFFEXOR) 37.5 MG tablet     ALLERGIES  Allergies   Allergen Reactions     Citalopram Nausea and Vomiting     Trazodone Nausea and Vomiting     Sulfa Drugs Rash          Review of Systems   Constitutional: Negative.    HENT: Negative.    Eyes: Negative.    Respiratory: Negative.    Cardiovascular: Negative.    Gastrointestinal: Negative.    Genitourinary: Negative.    Musculoskeletal: Negative.    Skin: Negative.    Neurological: Negative.    Psychiatric/Behavioral: Positive for decreased concentration and suicidal ideas. Negative for hallucinations. The patient is  nervous/anxious. The patient is not hyperactive.    All other systems reviewed and are negative.        Physical Exam   BP: 103/63  Pulse: 105  Temp: 98.8  F (37.1  C)  Resp: 16  Weight: 54 kg (119 lb)  SpO2: 99 %  Physical Exam  Vitals and nursing note reviewed.   HENT:      Head: Normocephalic.   Eyes:      Pupils: Pupils are equal, round, and reactive to light.   Pulmonary:      Effort: Pulmonary effort is normal.   Musculoskeletal:         General: Normal range of motion.      Cervical back: Normal range of motion.   Neurological:      General: No focal deficit present.      Mental Status: She is alert.   Psychiatric:         Attention and Perception: Attention and perception normal. She does not perceive auditory or visual hallucinations.         Mood and Affect: Mood is anxious and depressed. Affect is blunt.         Speech: Speech normal.         Behavior: Behavior is withdrawn. Behavior is not agitated, aggressive, hyperactive or combative. Behavior is cooperative.         Thought Content: Thought content is not paranoid or delusional. Thought content includes suicidal ideation. Thought content does not include homicidal ideation. Thought content includes suicidal plan.         Cognition and Memory: Cognition and memory normal.         Judgment: Judgment is impulsive.         ED Course      Procedures            Results for orders placed or performed during the hospital encounter of 09/09/21   Urine Drugs of Abuse Screen     Status: None ()    Narrative    The following orders were created for panel order Urine Drugs of Abuse Screen.  Procedure                               Abnormality         Status                     ---------                               -----------         ------                     Drug abuse screen 1 urin...[[id number]]                                                   Please view results for these tests on the individual orders.     Medications - No data to display     Assessments &  Plan (with Medical Decision Making)   Patient with history of schizoaffective disorder who has been feeling depressed and suicidal and was recently hospitalized after a suicide attempt and spending 9 days in the ICU. She continues to feel suicidal and has even a more lethal plan of suicide. She feels unsafe and would like to be admitted. She is referred. She is voluntary.    I have reviewed the nursing notes. I have reviewed the findings, diagnosis, plan and need for follow up with the patient.    New Prescriptions    No medications on file       Final diagnoses:   Schizoaffective disorder, bipolar type (H)   Suicidal intent       --  Frankie Sheldon MD  [facility] EMERGENCY DEPARTMENT  9/9/2021     Frankie Sheldon MD  09/09/21 5601

## 2021-09-09 NOTE — TELEPHONE ENCOUNTER
S: [facility] supervisor calling with bypass, 22yo F, BEC [place] ED, Suicide attempt    B: PT overdosed on ibuprofen 16 days ago and spent 6 days IP clearing medically from this overdose, then transferred to [facility] and spent 9 days IP MH there. Pt was discharged and went home, immediately felt that it was a mistake and is again suicidal presenting to [place] ED B[facility]. Today Pt saw her PCP and discussed her SI, provider sent Pt to ED to be assessed for IP MH placement. Pt hx of schizoaffective disorder, Rx gabapentin, hydroxyzine, effexor. Pt denies acute medical concerns. Pt denies drug and alcohol concerns. Pt medically cleared and ambulatory. Pt endorses a suicide plan to drink liquid nicotine. Pt reports an increase in anxiety and depression over the past month that has continued to worsen. Pt reports a month ago she was having AH that told her she was crazy and that she would be better off dead.     A: voluntary for admission, medically cleared, Utox negative, COVID negative.     R: Patient cleared and ready for behavioral bed placement: Yes  5:20pm - provider accepts for [facility]/4A.   Unit and ED informed of disposition

## 2021-09-10 LAB
ALBUMIN SERPL-MCNC: 3.8 G/DL (ref 3.4–5)
ALP SERPL-CCNC: 75 U/L (ref 40–150)
ALT SERPL W P-5'-P-CCNC: 99 U/L (ref 0–50)
ANION GAP SERPL CALCULATED.3IONS-SCNC: 7 MMOL/L (ref 3–14)
AST SERPL W P-5'-P-CCNC: 54 U/L (ref 0–45)
BASOPHILS # BLD AUTO: 0 10E3/UL (ref 0–0.2)
BASOPHILS NFR BLD AUTO: 0 %
BILIRUB SERPL-MCNC: 0.3 MG/DL (ref 0.2–1.3)
BUN SERPL-MCNC: 10 MG/DL (ref 7–30)
CALCIUM SERPL-MCNC: 8.8 MG/DL (ref 8.5–10.1)
CHLORIDE BLD-SCNC: 104 MMOL/L (ref 94–109)
CHOLEST SERPL-MCNC: 198 MG/DL
CO2 SERPL-SCNC: 28 MMOL/L (ref 20–32)
CREAT SERPL-MCNC: 0.66 MG/DL (ref 0.52–1.04)
EOSINOPHIL # BLD AUTO: 0.1 10E3/UL (ref 0–0.7)
EOSINOPHIL NFR BLD AUTO: 1 %
ERYTHROCYTE [DISTWIDTH] IN BLOOD BY AUTOMATED COUNT: 12.6 % (ref 10–15)
GFR SERPL CREATININE-BSD FRML MDRD: >90 ML/MIN/1.73M2
GLUCOSE BLD-MCNC: 86 MG/DL (ref 70–99)
HCT VFR BLD AUTO: 39.2 % (ref 35–47)
HDLC SERPL-MCNC: 68 MG/DL
HGB BLD-MCNC: 12.5 G/DL (ref 11.7–15.7)
IMM GRANULOCYTES # BLD: 0 10E3/UL
IMM GRANULOCYTES NFR BLD: 0 %
LDLC SERPL CALC-MCNC: 109 MG/DL
LYMPHOCYTES # BLD AUTO: 2.6 10E3/UL (ref 0.8–5.3)
LYMPHOCYTES NFR BLD AUTO: 42 %
MCH RBC QN AUTO: 31.3 PG (ref 26.5–33)
MCHC RBC AUTO-ENTMCNC: 31.9 G/DL (ref 31.5–36.5)
MCV RBC AUTO: 98 FL (ref 78–100)
MONOCYTES # BLD AUTO: 0.7 10E3/UL (ref 0–1.3)
MONOCYTES NFR BLD AUTO: 11 %
NEUTROPHILS # BLD AUTO: 2.9 10E3/UL (ref 1.6–8.3)
NEUTROPHILS NFR BLD AUTO: 46 %
NONHDLC SERPL-MCNC: 130 MG/DL
NRBC # BLD AUTO: 0 10E3/UL
NRBC BLD AUTO-RTO: 0 /100
PLATELET # BLD AUTO: 270 10E3/UL (ref 150–450)
POTASSIUM BLD-SCNC: 4 MMOL/L (ref 3.4–5.3)
PROT SERPL-MCNC: 7 G/DL (ref 6.8–8.8)
RBC # BLD AUTO: 3.99 10E6/UL (ref 3.8–5.2)
SODIUM SERPL-SCNC: 139 MMOL/L (ref 133–144)
TRIGL SERPL-MCNC: 106 MG/DL
TSH SERPL DL<=0.005 MIU/L-ACNC: 0.71 MU/L (ref 0.4–4)
WBC # BLD AUTO: 6.3 10E3/UL (ref 4–11)

## 2021-09-10 PROCEDURE — 36415 COLL VENOUS BLD VENIPUNCTURE: CPT | Performed by: PSYCHIATRY & NEUROLOGY

## 2021-09-10 PROCEDURE — 84443 ASSAY THYROID STIM HORMONE: CPT | Performed by: PSYCHIATRY & NEUROLOGY

## 2021-09-10 PROCEDURE — 250N000013 HC RX MED GY IP 250 OP 250 PS 637: Performed by: PSYCHIATRY & NEUROLOGY

## 2021-09-10 PROCEDURE — 99223 1ST HOSP IP/OBS HIGH 75: CPT | Mod: AI | Performed by: PSYCHIATRY & NEUROLOGY

## 2021-09-10 PROCEDURE — 85004 AUTOMATED DIFF WBC COUNT: CPT | Performed by: PSYCHIATRY & NEUROLOGY

## 2021-09-10 PROCEDURE — G0177 OPPS/PHP; TRAIN & EDUC SERV: HCPCS

## 2021-09-10 PROCEDURE — 124N000002 HC R&B MH UMMC

## 2021-09-10 PROCEDURE — 80061 LIPID PANEL: CPT | Performed by: PSYCHIATRY & NEUROLOGY

## 2021-09-10 PROCEDURE — 82040 ASSAY OF SERUM ALBUMIN: CPT | Performed by: PSYCHIATRY & NEUROLOGY

## 2021-09-10 RX ORDER — GABAPENTIN 300 MG/1
600 CAPSULE ORAL AT BEDTIME
Status: DISCONTINUED | OUTPATIENT
Start: 2021-09-10 | End: 2021-09-15 | Stop reason: HOSPADM

## 2021-09-10 RX ORDER — QUETIAPINE 400 MG/1
400 TABLET, FILM COATED, EXTENDED RELEASE ORAL EVERY EVENING
Status: DISCONTINUED | OUTPATIENT
Start: 2021-09-10 | End: 2021-09-15 | Stop reason: HOSPADM

## 2021-09-10 RX ADMIN — CLONAZEPAM 0.5 MG: 0.5 TABLET ORAL at 08:30

## 2021-09-10 RX ADMIN — GABAPENTIN 600 MG: 300 CAPSULE ORAL at 21:06

## 2021-09-10 RX ADMIN — MIRTAZAPINE 15 MG: 15 TABLET, FILM COATED ORAL at 21:06

## 2021-09-10 RX ADMIN — CLONAZEPAM 0.5 MG: 0.5 TABLET ORAL at 21:06

## 2021-09-10 RX ADMIN — SERTRALINE HYDROCHLORIDE 100 MG: 100 TABLET ORAL at 08:30

## 2021-09-10 RX ADMIN — QUETIAPINE FUMARATE 400 MG: 400 TABLET, EXTENDED RELEASE ORAL at 21:06

## 2021-09-10 NOTE — PLAN OF CARE
Problem: Suicidal Behavior  Goal: Suicidal Behavior is Absent or Managed  Outcome: Improving  Flowsheets (Taken 9/10/2021 1825)  Mutually Determined Action Steps (Suicidal Behavior Absent/Managed): verbalizes safety check rationale     Patient is visible in the milieu, likes to socialize with other patients, denies SI,SIB reports hearing voices in her head, did not want to elaborate more about the voices, pt contracted for safety. Patient said she is less anxious today but still feel depressed, pt ate 85% of her dinner tray. Patient had a visit from her Dad that went well. Pt refused to participate in groups. Took all her scheduled medications.

## 2021-09-10 NOTE — PROGRESS NOTES
"Clinical Nutrition Services Brief Note - MST score >/= 2     Rafaela Mckeon is a 21 year old female screened by the dietitian d/t MST score of 2 d/t \"unsure\" of possible weight loss     Per documented weight history, patient's weight stable.  Wt Readings from Last 10 Encounters:  09/09/21 : 53.4 kg (117 lb 12.8 oz)  06/28/21 : 53.5 kg (118 lb) (30 %, Z= -0.53)*  10/24/20 : 53.5 kg (118 lb) (32 %, Z= -0.46)*  10/21/20 : 53.5 kg (118 lb) (32 %, Z= -0.46)*  06/27/20 : 53.5 kg (118 lb) (34 %, Z= -0.42)*  02/21/20 : 54 kg (119 lb) (37 %, Z= -0.32)*  01/07/20 : 54.2 kg (119 lb 7.8 oz) (39 %, Z= -0.28)*  03/01/19 : 51.7 kg (114 lb) (31 %, Z= -0.49)*  12/23/18 : 52.2 kg (115 lb) (34 %, Z= -0.41)*  12/26/14 : 49.9 kg (110 lb) (61 %, Z= 0.29)*    * Growth percentiles are based on CDC (Girls, 2-20 Years) data.     Due to no weight loss and intake likely to improve with symptom stabilization, RD to sign off at this time. RD available by consult if further nutrition intervention warranted prior to discharge.    Claudia Sifuentes RD, LD  ICU/5A/OB/Mental Health Pager (M-F): [phone]  On Call Pager (weekends only): [phone]         "

## 2021-09-10 NOTE — PLAN OF CARE
The patient specific goals include:   Patient will participate in unit programming  Patient will identify triggers and positive coping skills  Patient will take medications as prescribed by physician both for mental health and medical  Patient coached to work on coping packet    The patient identified the following reasons for hospitalization:  Suicidal ideations  Depression    The patient identified the following goals for discharge:   IOP In-person program   Start Individual Therapy

## 2021-09-10 NOTE — PLAN OF CARE
Problem: General Plan of Care (Inpatient Behavioral)  Goal: Individualization/Patient Specific Goal (IP Behavioral)  Description: The patient and/or their representative will achieve their patient-specific goals related to the plan of care.    The patient-specific goals include:  Flowsheets (Taken 9/10/2021 1257)  Areas of Vulnerability: Just discharged from [facility] on 9/7, significant overdose on 8/22, prior hx of mental health, lacks honest communication with support system, age, trauma hx  Patient Strengths:   Steady employment   Stable and supportive family   Stable housing   Adherent to medication regime

## 2021-09-10 NOTE — PLAN OF CARE
Problem: General Rehab Plan of Care  Goal: Therapeutic Recreation/Music Therapy Goal (Art Therapy)    Art Therapy directive is to create a mandala and/or artwork inspired by a personal intention for the day using a variety of natural materials (flowers/plants).  Goals of directive: mindfulness, emotional expression, media exploration.  Pt was a quiet participant, focused on task for the full duration of group. Pt finished artwork and and chose not to share with group at this time. Pts mood was calm, pleasant participant.
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
## 2021-09-10 NOTE — PLAN OF CARE
BEHAVIORAL TEAM DISCUSSION    Participants: 4A Provider:  Dr. Stef Ji ; 4A RN's: Morena Tomlinson RN; 4A CTC's:  Joy Tubbs (CTC).  Progress:  Continuing Assess .  Continued Stay Criteria/Rationale: New Patient  Medical/Physical: POTS and Foreign-Danlos Syndrome  Precautions:    Behavioral Orders   Procedures    Code 1 - Restrict to Unit    Discontinue 1:1 attendant for suicide risk     Order Specific Question:   I have performed an in person assessment of the patient     Answer:   Based on this assessment the patient no longer requires a one on one attendant at this point in time.     Order Specific Question:   Rationale     Answer:   Patient States able to remain safe in hospital    Routine Programming     As clinically indicated    Status 15     Every 15 minutes.    Suicide precautions     Patients on Suicide Precautions should have a Combination Diet ordered that includes a Diet selection(s) AND a Behavioral Tray selection for Safe Tray - with utensils, or Safe Tray - NO utensils       Plan: CTC will meet with pt to complete psychosocial assessment. CTC will coordinate disposition and after care plans.  The following services will be provided to the patient; psychiatric assessment, medication management, therapeutic milieu, individual and group support, art therapy, and skills/OT groups.   Rationale for change in precautions or plan: No Change.

## 2021-09-10 NOTE — PLAN OF CARE
INITIAL OT NOTE     Problem: OT General Care Plan  Goal: OT Goal 1  Description: Work with pt to increase awareness of how symptoms can impact performance on goal-directed tasks and life management skills. Will provide opportunities to build on coping strategies to manage symptoms during hospitalization and after d/c.    Description: Rafaela attended 2 occupational therapy groups today.   Occupation-based collaborative meal preparation group task: cowboy caviar and guacamole. Purpose of structured group: provide socialization, promote sensory integration, encourage patience and concentration, and develop independent living skills. Pt Response: presented with low energy at the start of group. Pt did collaborate well with her peers and writer to accurately follow step-by-step instructions and followed sanitation/hygiene guidelines provided by writer. Pt was engaged for most of group and respectfully allowed new peers to have a chance participating in the group activity during the next group.  Occupational therapy clinic. Purpose of structured group: exploration/development of positive coping skills, engagement in creative expression and clinical observation of social, cognitive, and kinesthetic performance skills. Pt response: chosen activity: stained glass. Pt was able to ask for assistance as needed, and independently initiate self-selected task though appeared to follow the lead of peers. Pt was social with peers while able to focus on her task simultaneously. Calm, cooperative, and engaged.     Affect/Hygiene/Presentation: Affect blunted, hygiene WFL.    Assessment: Demonstrates benefit from engagement in OT groups that support healthy recovery, specifically exploration of positive coping skills for symptom management, relapse prevention, and resumption of personal roles, routines and daily occupations.    Plan: OT staff will meet with pt to review the role of occupational therapy and explain the value of having  them involved in their treatment plan including options to meet current needs/self-identified goals. As group attendance is established, continued clinical observations will be made and pt will be given self-assessment to inform OT initial assessment. Continue to offer graded occupation-based activities for ongoing assessment and increased success towards IP goals.

## 2021-09-10 NOTE — PROGRESS NOTES
NOC Shift Report    Pt in bed at beginning of shift, breathing quiet and unlabored. Pt slept through shift. Pt slept  7 hours.     No pt complaints or concerns at this time.     No PRNs given. Will continue to monitor.     Precautions: Suicide

## 2021-09-10 NOTE — PLAN OF CARE
"  Problem: Suicidal Behavior  Goal: Suicidal Behavior is Absent or Managed  Outcome: No Change     Admitted a 20 y/o female patient from [facility] due to suicidal ideation, per report pt endorses a suicide plan to drink liquid nicotine.  Patient was calm and cooperative when she came to this unit, vital signs taken, body search was done, no contrabands found. Patient said she feels safe on the unit and contracted for safety.  Patient overdosed on ibuprofen 16 days ago and spent 6 days IP clearing medically from this overdose, then [facility] and spent 9 days IP MH there. Pt was discharged and went home, immediately felt that it was a mistake and is again suicidal presenting to [facility].  Pt has hx of Schizoaffective disorder.  Pt reports an increase in anxiety and depression over the past month that has continued to worsen. Pt reports auditory hallucinations, \"the voices are mean to me\" , as verbalized.  Pt is on vegetarian diet.  PTA meds: zoloft, clonazepam, seroquel, mirtazipine, gabapentin  COVID negative.  UTOX negative.    "

## 2021-09-10 NOTE — H&P
"  H&P, preliminary    Admission Date: 09/09/2021  Date of Service: 09/10/2021    The patient was seen, her chart reviewed, her case discussed with staff at the Team Meeting. Full report to follow.    Dx: Schizoaffective Disorder, depressed         BARRIE         PTSD    Plan: Full Code. Status 15. Suicide precautions. Increase Seroquel XR to 400 mg QPM. Increase Gabapentin to 600 mg at bedtime for \"sleep anxiety\". Continue Zoloft, Remeron and Klonopin in the same doses but consider decreasing or stopping Zoloft if delusions and hallucinations persist. Her care will be assumed by Deb Naegele APRN CNS on Monday.    Wolf Ji MD  "

## 2021-09-10 NOTE — PLAN OF CARE
"  Problem: General Plan of Care (Inpatient Behavioral)  Goal: Individualization/Patient Specific Goal (IP Behavioral)  Description: The patient will remain safe and demonstrate improved coping and sleep.  Outcome: No Change  Patient visible this morning, very blunt, endorsing depression, anxiety and negative thinking appears tense prior to seeing doctor; patient denies SI/SIB and contracts for safety, reports appetite \"okay\"patient attends groups and appears to be starting to socialize.  Will continue to monitor, promote engagement and skills.     "

## 2021-09-11 PROCEDURE — 250N000013 HC RX MED GY IP 250 OP 250 PS 637: Performed by: PSYCHIATRY & NEUROLOGY

## 2021-09-11 PROCEDURE — H2032 ACTIVITY THERAPY, PER 15 MIN: HCPCS

## 2021-09-11 PROCEDURE — 124N000002 HC R&B MH UMMC

## 2021-09-11 RX ADMIN — CLONAZEPAM 0.5 MG: 0.5 TABLET ORAL at 08:37

## 2021-09-11 RX ADMIN — CLONAZEPAM 0.5 MG: 0.5 TABLET ORAL at 21:49

## 2021-09-11 RX ADMIN — SERTRALINE HYDROCHLORIDE 100 MG: 100 TABLET ORAL at 08:37

## 2021-09-11 RX ADMIN — GABAPENTIN 600 MG: 300 CAPSULE ORAL at 21:48

## 2021-09-11 RX ADMIN — MIRTAZAPINE 15 MG: 15 TABLET, FILM COATED ORAL at 21:49

## 2021-09-11 RX ADMIN — QUETIAPINE FUMARATE 400 MG: 400 TABLET, EXTENDED RELEASE ORAL at 21:49

## 2021-09-11 ASSESSMENT — ACTIVITIES OF DAILY LIVING (ADL)
LAUNDRY: WITH SUPERVISION
HYGIENE/GROOMING: INDEPENDENT
DRESS: INDEPENDENT
ORAL_HYGIENE: INDEPENDENT

## 2021-09-11 NOTE — PLAN OF CARE
Problem: Suicidal Behavior  Goal: Suicidal Behavior is Absent or Managed  Outcome: No Change  Flowsheets (Taken 9/11/2021 1812)  Mutually Determined Action Steps (Suicidal Behavior Absent/Managed): verbalizes safety check rationale     Patient is visible in the milieu, likes to socialize with other patients, likes to color coloring sheets, denies SI,SIB, reports hearing voices in her head, pt contracted for safety in the unit, reports feeling less anxious and depressed today. Patient is requesting to have her stuffed toy animal but staff told her that it needs to be discussed by the treatment team, pt was okay with it. Patient ate 90% of her dinner. Pt participated in groups. Remained calm and pleasant to staff.  Took all her scheduled meds.  Will continue to monitor.
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
## 2021-09-11 NOTE — PLAN OF CARE
"  Problem: Suicidal Behavior  Goal: Suicidal Behavior is Absent or Managed  Outcome: No Change    Patient visible, social, affect blunt, continues to endorse depression and passive SI, \"sometimes I think it would just be easier I'm not going to lie\" , denies any plan or intent and feels she is able to keep herself safe. Patient is motivated to go to groups, reports appetite and sleep is good. Patient appears reluctant and hesitant to speak up stating she doesn't want to bother staff. Patient reports not being happy that she wasn't awakened for evening activities. She states she feels a little tired or \"blah\" today and believes it is due to her medication increase and felt the provider did not communicate his plan to increase the dose. She further states she wants to switch her antidepressant to Prozac; she says she use to take it but was switched to another before the dose was increased.  VSS and denies any physical pain concerns.  Discussed positive affirmations and benefits of asserting herself in both the short term and long-term of which patient was receptive. Later, patient makes a sign to put on her room door to be awaken for meals and groups. Patient given her own clothes to wear today after washing them.    Dad visit today and appears to go well, patient brighter in affect following visit.     "

## 2021-09-12 PROCEDURE — 250N000013 HC RX MED GY IP 250 OP 250 PS 637: Performed by: PSYCHIATRY & NEUROLOGY

## 2021-09-12 PROCEDURE — 124N000002 HC R&B MH UMMC

## 2021-09-12 RX ADMIN — CLONAZEPAM 0.5 MG: 0.5 TABLET ORAL at 21:48

## 2021-09-12 RX ADMIN — GABAPENTIN 600 MG: 300 CAPSULE ORAL at 21:48

## 2021-09-12 RX ADMIN — SERTRALINE HYDROCHLORIDE 100 MG: 100 TABLET ORAL at 08:25

## 2021-09-12 RX ADMIN — CLONAZEPAM 0.5 MG: 0.5 TABLET ORAL at 08:25

## 2021-09-12 RX ADMIN — QUETIAPINE FUMARATE 400 MG: 400 TABLET, EXTENDED RELEASE ORAL at 21:48

## 2021-09-12 RX ADMIN — MIRTAZAPINE 15 MG: 15 TABLET, FILM COATED ORAL at 21:49

## 2021-09-12 ASSESSMENT — ACTIVITIES OF DAILY LIVING (ADL)
LAUNDRY: WITH SUPERVISION
HYGIENE/GROOMING: INDEPENDENT
ORAL_HYGIENE: INDEPENDENT
DRESS: INDEPENDENT

## 2021-09-12 ASSESSMENT — MIFFLIN-ST. JEOR: SCORE: 1288.43

## 2021-09-12 NOTE — PLAN OF CARE
"  Problem: Suicidal Behavior  Goal: Suicidal Behavior is Absent or Managed  Outcome: No Change  Flowsheets (Taken 9/12/2021 1402)  Mutually Determined Action Steps (Suicidal Behavior Absent/Managed):    sets future-oriented goal    verbalizes safety check rationale    shares suicidal thoughts    identifies protective factors    Patient remains visible and active in milieu engaging appropriately with others, reports \"doing good, less anxious rates anxiety a 2/10 and depression 5/10, 10 being worst and denies any negative voices and attributes this to her med increase at night and \"adjusting\", endorses fleeting SI  yet denies any plan or intent and able to keep self safe in hospital; attends groups and working on her assertiveness-came up to med window today for medications, denies any physical pain, VSS and reports appetite and sleep is \"okay\".  Patient reports she wants to meet with provider to discuss med change tomorrow.  Patient also states her Mother plans to visit this evening. Progressing slowly and benefits from group engagement.     "

## 2021-09-12 NOTE — PROGRESS NOTES
NOC Shift Report    Pt in bed at beginning of shift, breathing quiet and unlabored. Pt slept through shift. Pt slept 6.75 hours.     No pt complaints or concerns at this time.     No PRNs given. Will continue to monitor.     Precautions: Suicide

## 2021-09-12 NOTE — PLAN OF CARE
"  Problem: General Rehab Plan of Care  Goal: Therapeutic Recreation/Music Therapy Goal  Description: The patient and/or their representative will achieve their patient-specific goals related to the plan of care.  The patient-specific goals include: mindfulness through process art  Outcome: No Change     Rafaela attended art therapy group for 1 hour (8 patients total). She reported feeling \"calm\" but appeared nervous as evidenced by fidgeting with her hands and bouncing her leg. She participated in the art directive to make process art. She appeared more calm and focused while painting. She interacted appropriately with peers. She hesitated sharing her art with the group but appeared pleased and started talking about it once she received compliments from peers.   "

## 2021-09-13 PROCEDURE — 99232 SBSQ HOSP IP/OBS MODERATE 35: CPT | Performed by: CLINICAL NURSE SPECIALIST

## 2021-09-13 PROCEDURE — 124N000002 HC R&B MH UMMC

## 2021-09-13 PROCEDURE — G0177 OPPS/PHP; TRAIN & EDUC SERV: HCPCS

## 2021-09-13 PROCEDURE — 250N000013 HC RX MED GY IP 250 OP 250 PS 637: Performed by: PSYCHIATRY & NEUROLOGY

## 2021-09-13 PROCEDURE — H2032 ACTIVITY THERAPY, PER 15 MIN: HCPCS

## 2021-09-13 RX ORDER — FLUOXETINE 10 MG/1
10 CAPSULE ORAL DAILY
Status: DISCONTINUED | OUTPATIENT
Start: 2021-09-14 | End: 2021-09-15 | Stop reason: HOSPADM

## 2021-09-13 RX ADMIN — CLONAZEPAM 0.5 MG: 0.5 TABLET ORAL at 21:50

## 2021-09-13 RX ADMIN — QUETIAPINE FUMARATE 400 MG: 400 TABLET, EXTENDED RELEASE ORAL at 21:51

## 2021-09-13 RX ADMIN — SERTRALINE HYDROCHLORIDE 100 MG: 100 TABLET ORAL at 08:32

## 2021-09-13 RX ADMIN — GABAPENTIN 600 MG: 300 CAPSULE ORAL at 21:51

## 2021-09-13 RX ADMIN — CLONAZEPAM 0.5 MG: 0.5 TABLET ORAL at 08:32

## 2021-09-13 RX ADMIN — MIRTAZAPINE 15 MG: 15 TABLET, FILM COATED ORAL at 21:51

## 2021-09-13 ASSESSMENT — ACTIVITIES OF DAILY LIVING (ADL)
DRESS: INDEPENDENT
ORAL_HYGIENE: INDEPENDENT
DRESS: INDEPENDENT
HYGIENE/GROOMING: INDEPENDENT
ORAL_HYGIENE: INDEPENDENT
HYGIENE/GROOMING: INDEPENDENT

## 2021-09-13 NOTE — PLAN OF CARE
Shift update: Rafaela's mood is stable. She feels less anxious here today, and feels like the SO is more controlled. She's hopeful about going to an IOP to help manage the depression/anxiety and SI. Eating well, social and in milieu. .Able to ask for things appropriately from staff.     Mood/Affect: restricted, but answering questions. Slightly anxious, but unclear as to why. She said it's non-specific     SI: none   SIB: none     Hallucinations/Psychosis: none    Activity/Participation: in milieu and appropriately active. Participates in group   PRN Meds: none  Appetite: good - no issues    Medical: none

## 2021-09-13 NOTE — PLAN OF CARE
Problem: OT General Care Plan  Goal: OT Goal 1  Description: Work with pt to increase awareness of how symptoms can impact performance on goal-directed tasks and life management skills. Will provide opportunities to build on coping strategies to manage symptoms during hospitalization and after d/c.    Pt actively participated in occupational therapy clinic with 6 patients total x45 minutes. Pt was able to ask for assistance as needed, and independently return to a creative expression task. Pt demonstrated good focus, planning, and attention to detail. Pt appeared comfortable interacting with peers. Calm, pleasant, cooperative, and engaged throughout this group. Will continue to assess.

## 2021-09-13 NOTE — PLAN OF CARE
Assessment/Intervention/Current Symptoms and Care Coordination    Attended team and revied chart. CTC received spoke with Sujatha from [facility] re: pt's discharge plan and after care appointments. Sujatha sent over a list of in network PHP and IOP programs for pt as pt did not want to reschedule her intake for IOP at [place]. Sujatha was able to cancel this appointment.     Discharge Plan or Goal: Return home. IOP, individual therapy and medication management.       Barriers to Discharge: Symptom Management.         Referral Status: None          Legal Status: Voluntary

## 2021-09-13 NOTE — PLAN OF CARE
Problem: Suicidal Behavior  Goal: Suicidal Behavior is Absent or Managed  Outcome: Improving  Flowsheets (Taken 9/12/2021 1926)  Mutually Determined Action Steps (Suicidal Behavior Absent/Managed): verbalizes safety check rationale     Patient is visible in the milieu, likes to work on her coloring sheets in the dining area, enjoys socializing with other patients. Denies SI,SIB and denies auditory hallucinations at this time, patient said she feels a lot better since her medications were increased, rated her anxiety 1/10 and depression 5/10. Patient ate all her dinner and ate snacks. Patient had a visit from mom tonight that went well.   Took all her scheduled medications.  Will continue to monitor.

## 2021-09-13 NOTE — PROGRESS NOTES
"[facility], [place]   Psychiatric Progress Note        Interim History:   The patient's care was discussed with the treatment team during the daily team meeting and/or staff's chart notes were reviewed.  Staff report patient isolated to her room.     Psychiatric symptoms and interventions:   Upon interview with patient, she reports passive suicidal thinking. Patient states sertraline is \"not working\". She reports increased depression. Provider discussed starting Prozac. Patient reports Prozac was more beneficial for her in the past.     Patient reports chronic negative thinking. She presents with a flat affect and gives minimal answers to questions. She reports that she will go to a virtual outpatient treatment. She isconsidering day treatment.          Medications:       clonazePAM  0.5 mg Oral BID     [START ON 9/14/2021] FLUoxetine  10 mg Oral Daily     gabapentin  600 mg Oral At Bedtime     mirtazapine  15 mg Oral At Bedtime     QUEtiapine ER  400 mg Oral QPM          Allergies:     Allergies   Allergen Reactions     Citalopram Nausea and Vomiting     Trazodone Nausea and Vomiting     Sulfa Drugs Rash          Labs:   No results found for this or any previous visit (from the past 24 hour(s)).       Psychiatric Examination:     /72 (BP Location: Right arm)   Pulse 97   Temp 97.7  F (36.5  C) (Oral)   Resp 16   Ht 1.626 m (5' 4\")   Wt 53.3 kg (117 lb 9.6 oz)   SpO2 96%   BMI 20.19 kg/m    Weight is 117 lbs 9.6 oz  Body mass index is 20.19 kg/m .  Orthostatic Vitals       Most Recent      Standing Orthostatic BP 95/61 09/13 0900    Standing Orthostatic Pulse (bpm) 116 09/13 0900            Appearance: awake, alert, adequately groomed and dressed in hospital scrubs  Attitude:  cooperative  Eye Contact:  good  Mood:  depressed  Affect:  intensity is blunted  Speech:  paucity of speech  Psychomotor Behavior:  no evidence of tardive dyskinesia, dystonia, or tics  Throught " Process:  goal oriented  Associations:  no loose associations  Thought Content:  passive suicidal ideation present  Insight:  fair  Judgement:  fair  Oriented to:  time, person, and place  Attention Span and Concentration:  intact  Recent and Remote Memory:  intact    Clinical Global Impressions  First:     Most recent:            Precautions:     Behavioral Orders   Procedures     Code 1 - Restrict to Unit     Discontinue 1:1 attendant for suicide risk     Order Specific Question:   I have performed an in person assessment of the patient     Answer:   Based on this assessment the patient no longer requires a one on one attendant at this point in time.     Order Specific Question:   Rationale     Answer:   Patient States able to remain safe in hospital     Routine Programming     As clinically indicated     Status 15     Every 15 minutes.     Suicide precautions     Patients on Suicide Precautions should have a Combination Diet ordered that includes a Diet selection(s) AND a Behavioral Tray selection for Safe Tray - with utensils, or Safe Tray - NO utensils            DIagnoses:   1.  Schizoaffective disorder, depressed.  2.  S/P Serious overdose with acetaminophen   3  Generalized anxiety disorder.  4.  PTSD.         Plan:     Legal status: Voluntary     Medication management:   Sertraline - discontinued  Prozac 10 mg start on 9/14  gabapenitn 600 mg HS   clonazepam 0.5 mg BID   mirtazapine 15 mg   Seroquel  mg     Medical: ALT 99 elevated, AST 54 elevated , recheck    Behavioral/psychology/social  Encouraged patient to attend theraputic hospital prorgramming as tolerated     Disposition:   Reason for continued hospitalization: patient reports suicidal thinking   Stabilization with medications, provide structured and suppoirtive environment, groups  Suicide precautions

## 2021-09-14 PROCEDURE — 250N000013 HC RX MED GY IP 250 OP 250 PS 637: Performed by: PSYCHIATRY & NEUROLOGY

## 2021-09-14 PROCEDURE — 99232 SBSQ HOSP IP/OBS MODERATE 35: CPT | Performed by: CLINICAL NURSE SPECIALIST

## 2021-09-14 PROCEDURE — 250N000013 HC RX MED GY IP 250 OP 250 PS 637: Performed by: CLINICAL NURSE SPECIALIST

## 2021-09-14 PROCEDURE — 124N000002 HC R&B MH UMMC

## 2021-09-14 RX ADMIN — QUETIAPINE FUMARATE 400 MG: 400 TABLET, EXTENDED RELEASE ORAL at 21:40

## 2021-09-14 RX ADMIN — CLONAZEPAM 0.5 MG: 0.5 TABLET ORAL at 08:33

## 2021-09-14 RX ADMIN — CLONAZEPAM 0.5 MG: 0.5 TABLET ORAL at 21:38

## 2021-09-14 RX ADMIN — MIRTAZAPINE 15 MG: 15 TABLET, FILM COATED ORAL at 21:40

## 2021-09-14 RX ADMIN — FLUOXETINE 10 MG: 10 CAPSULE ORAL at 08:34

## 2021-09-14 RX ADMIN — GABAPENTIN 600 MG: 300 CAPSULE ORAL at 21:40

## 2021-09-14 ASSESSMENT — ACTIVITIES OF DAILY LIVING (ADL)
LAUNDRY: WITH SUPERVISION
LAUNDRY: WITH SUPERVISION
ORAL_HYGIENE: INDEPENDENT
DRESS: INDEPENDENT
DRESS: INDEPENDENT
HYGIENE/GROOMING: INDEPENDENT
HYGIENE/GROOMING: INDEPENDENT
ORAL_HYGIENE: INDEPENDENT

## 2021-09-14 ASSESSMENT — MIFFLIN-ST. JEOR: SCORE: 1287.98

## 2021-09-14 NOTE — PLAN OF CARE
Problem: OT General Care Plan  Goal: OT Goal 1  Description: Work with pt to increase awareness of how symptoms can impact performance on goal-directed tasks and life management skills. Will provide opportunities to build on coping strategies to manage symptoms during hospitalization and after d/c.    Pt attended 1 out of 2 OT groups offered. Pt actively participated in occupational therapy clinic with 6-7 patients total x55 minutes. Pt was able to ask for assistance as needed, and independently return to a creative expression task. Pt demonstrated good focus, planning, and attention to detail. Social with peers throughout. Expressed satisfaction with her finished project, and accepted compliments on it. Calm, pleasant, cooperative, and engaged. Affect appeared to brighten in interactions.

## 2021-09-14 NOTE — PROGRESS NOTES
Behavioral Health  Note    Behavioral Health  Spirituality Group Note    [facility] [address]    Name: Rafaela Mckeon YOB: 2000   MRN: [id number] Age: 21 year old      Patient attended -led group, which included discussion of trust both in ourselves and other people.  Rafaela was engaged throughout the group and participated actively in the discussion.      Patient attended group for 1 hrs.    patient demonstrated an appreciation of topic's application for their personal circumstances.      Margaret Hansen    Pager: [phone]

## 2021-09-14 NOTE — PLAN OF CARE
"  Problem: Suicidal Behavior  Goal: Suicidal Behavior is Absent or Managed  Outcome: Improving    Patient presents as quiet, blunt and likes to socialize and groups; patient asked about the med plan for her switch from Sertraline to Prozac. Writer reviews this with patient who declines any printed med ed, \"I've been on this before\" Patient wonders why she has to wait two weeks until she can have an increased dose, \"I want to get started with Out patient treatment and I don't want to wait a long time to feel better,\" She states appetite and last night sleep,\"better\"  Writer walks and talks with patient who is listening to music headphones and pacing linda; encourages patient to discuss questions with provider-make a list if she needs, continue all her activity/skills/assertiveness; denies pain or current SI/IB/HI or hallucinations; encourage to come up with one positive for every negative she picks out in her day. Patient says she likes the friends she is making with the new patients and reminded that overall she feels her negative voice and fleeting SI thoughts are also less, patient states she was in her room yesterday because there was only 1 group; Thoughts intact/linear although negative at times.  Pulse elevated today 111 and 125 standing. Patient encouraged to drink fluids/stay hydrated. Will monitor and recheck at next shift.     "

## 2021-09-14 NOTE — PROGRESS NOTES
"[facility], [place]   Psychiatric Progress Note        Interim History:   The patient's care was discussed with the treatment team during the daily team meeting and/or staff's chart notes were reviewed.  Staff report patient has gone to some groups but other spends time in her room.     Psychiatric symptoms and interventions:   Upon interview with patient, reports her mood is \"OK\". She reports fleeting suicidal thinking. Patient reports she has been \"doing better\". She is concerned about having too much time on her hands when she leaves and wants discharge treatment to start immediately.     Patient is tolerating change from sertraline to Prozac. Patient denies any side effects from medications.           Medications:       clonazePAM  0.5 mg Oral BID     FLUoxetine  10 mg Oral Daily     gabapentin  600 mg Oral At Bedtime     mirtazapine  15 mg Oral At Bedtime     QUEtiapine ER  400 mg Oral QPM          Allergies:     Allergies   Allergen Reactions     Citalopram Nausea and Vomiting     Trazodone Nausea and Vomiting     Sulfa Drugs Rash          Labs:   No results found for this or any previous visit (from the past 24 hour(s)).       Psychiatric Examination:     /72 (BP Location: Right arm)   Pulse 97   Temp 97.7  F (36.5  C) (Oral)   Resp 16   Ht 1.626 m (5' 4\")   Wt 53.3 kg (117 lb 9.6 oz)   SpO2 96%   BMI 20.19 kg/m    Weight is 117 lbs 9.6 oz  Body mass index is 20.19 kg/m .  Orthostatic Vitals       Most Recent      Standing Orthostatic BP 95/61 09/13 0900    Standing Orthostatic Pulse (bpm) 116 09/13 0900      Appearance: awake, alert, adequately groomed and dressed in hospital scrubs  Attitude:  cooperative  Eye Contact:  good  Mood:  depressed  Affect:  intensity is blunted  Speech:  paucity of speech  Psychomotor Behavior:  no evidence of tardive dyskinesia, dystonia, or tics  Throught Process:  goal oriented  Associations:  no loose associations  Thought Content:  " passive suicidal ideation present  Insight:  fair  Judgement:  fair  Oriented to:  time, person, and place  Attention Span and Concentration:  intact  Recent and Remote Memory:  intact    Clinical Global Impressions  First:     Most recent:            Precautions:     Behavioral Orders   Procedures     Code 1 - Restrict to Unit     Discontinue 1:1 attendant for suicide risk     Order Specific Question:   I have performed an in person assessment of the patient     Answer:   Based on this assessment the patient no longer requires a one on one attendant at this point in time.     Order Specific Question:   Rationale     Answer:   Patient States able to remain safe in hospital     Routine Programming     As clinically indicated     Status 15     Every 15 minutes.     Suicide precautions     Patients on Suicide Precautions should have a Combination Diet ordered that includes a Diet selection(s) AND a Behavioral Tray selection for Safe Tray - with utensils, or Safe Tray - NO utensils            DIagnoses:   1.  Schizoaffective disorder, depressed.  2.  S/P Serious overdose with acetaminophen   3  Generalized anxiety disorder.  4.  PTSD.         Plan:   Legal status: Voluntary      Medication management:   Sertraline - discontinued  Prozac 10 mg start on 9/14  gabapenitn 600 mg HS   clonazepam 0.5 mg BID   mirtazapine 15 mg   Seroquel  mg      Medical: ALT 99 elevated, AST 54 elevated , recheck on 9/15     Behavioral/psychology/social  Encouraged patient to attend theraputic hospital prorgramming as tolerated   Suicide precautions   Disposition:   Reason for continued hospitalization: patient reports suicidal thinking   Stabilization with medications, provide structured and suppoirtive environment, groups  Return to home with day treatment.

## 2021-09-15 VITALS
SYSTOLIC BLOOD PRESSURE: 107 MMHG | RESPIRATION RATE: 16 BRPM | HEIGHT: 64 IN | OXYGEN SATURATION: 99 % | HEART RATE: 101 BPM | BODY MASS INDEX: 20.06 KG/M2 | DIASTOLIC BLOOD PRESSURE: 77 MMHG | TEMPERATURE: 96.1 F | WEIGHT: 117.5 LBS

## 2021-09-15 LAB
ALBUMIN SERPL-MCNC: 3.7 G/DL (ref 3.4–5)
ALP SERPL-CCNC: 80 U/L (ref 40–150)
ALT SERPL W P-5'-P-CCNC: 58 U/L (ref 0–50)
ANION GAP SERPL CALCULATED.3IONS-SCNC: 6 MMOL/L (ref 3–14)
AST SERPL W P-5'-P-CCNC: 20 U/L (ref 0–45)
BILIRUB SERPL-MCNC: 0.2 MG/DL (ref 0.2–1.3)
BUN SERPL-MCNC: 10 MG/DL (ref 7–30)
CALCIUM SERPL-MCNC: 8.7 MG/DL (ref 8.5–10.1)
CHLORIDE BLD-SCNC: 108 MMOL/L (ref 94–109)
CO2 SERPL-SCNC: 27 MMOL/L (ref 20–32)
CREAT SERPL-MCNC: 0.62 MG/DL (ref 0.52–1.04)
GFR SERPL CREATININE-BSD FRML MDRD: >90 ML/MIN/1.73M2
GLUCOSE BLD-MCNC: 82 MG/DL (ref 70–99)
HOLD SPECIMEN: NORMAL
POTASSIUM BLD-SCNC: 3.6 MMOL/L (ref 3.4–5.3)
PROT SERPL-MCNC: 7.1 G/DL (ref 6.8–8.8)
SODIUM SERPL-SCNC: 141 MMOL/L (ref 133–144)

## 2021-09-15 PROCEDURE — 99239 HOSP IP/OBS DSCHRG MGMT >30: CPT | Performed by: CLINICAL NURSE SPECIALIST

## 2021-09-15 PROCEDURE — 250N000013 HC RX MED GY IP 250 OP 250 PS 637: Performed by: CLINICAL NURSE SPECIALIST

## 2021-09-15 PROCEDURE — G0177 OPPS/PHP; TRAIN & EDUC SERV: HCPCS

## 2021-09-15 PROCEDURE — 36415 COLL VENOUS BLD VENIPUNCTURE: CPT | Performed by: CLINICAL NURSE SPECIALIST

## 2021-09-15 PROCEDURE — 80053 COMPREHEN METABOLIC PANEL: CPT | Performed by: CLINICAL NURSE SPECIALIST

## 2021-09-15 PROCEDURE — 250N000013 HC RX MED GY IP 250 OP 250 PS 637: Performed by: PSYCHIATRY & NEUROLOGY

## 2021-09-15 RX ORDER — MIRTAZAPINE 15 MG/1
15 TABLET, FILM COATED ORAL AT BEDTIME
COMMUNITY
End: 2024-04-08

## 2021-09-15 RX ORDER — FLUOXETINE 10 MG/1
10 CAPSULE ORAL DAILY
Qty: 30 CAPSULE | Refills: 0 | Status: SHIPPED | OUTPATIENT
Start: 2021-09-16 | End: 2024-04-08

## 2021-09-15 RX ORDER — QUETIAPINE 400 MG/1
400 TABLET, FILM COATED, EXTENDED RELEASE ORAL EVERY EVENING
Qty: 30 TABLET | Refills: 0 | Status: SHIPPED | OUTPATIENT
Start: 2021-09-15 | End: 2024-04-08

## 2021-09-15 RX ADMIN — CLONAZEPAM 0.5 MG: 0.5 TABLET ORAL at 08:26

## 2021-09-15 RX ADMIN — FLUOXETINE 10 MG: 10 CAPSULE ORAL at 08:26

## 2021-09-15 ASSESSMENT — ACTIVITIES OF DAILY LIVING (ADL)
ORAL_HYGIENE: INDEPENDENT
HYGIENE/GROOMING: INDEPENDENT
LAUNDRY: WITH SUPERVISION
DRESS: INDEPENDENT

## 2021-09-15 NOTE — PLAN OF CARE
Alert and oriented x 4, calm and cooperative. Voided good and had a BM this AM per pt . Denied having pain and discomfort. Pt was out socializing and doing puzzles with other pts. Flat affect, no PRN needed during this shift. No eye contact , she did not want to talk how she was feeling, gave yes no answers. Denied having SI, SIB or HI. At bedtime pt was in her room reading.
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
## 2021-09-15 NOTE — PROGRESS NOTES
Patient discharging 9/15/2021 accompanied by Mother and destination is home.  Patient completes safety plan, identifies triggers, coping skills and wellness strategies.  Patient agrees to call her best friend Hemant, Mother and Sister daily and check in similar to a diary card and skills she continues to practice and discuss  plan for the following day. Patient denies any current SI/SIB/HI thoughts, no negative voices and/or hallucinations currently.    Discharge paperwork and medications reviewed with patient who verbalizes understanding. Staff escort patient off unit at 1330 to meet mother at [facility] taking all personal belongings, ,medications and paperwork.    Copies provided: AVS -yes       Illness Management Recovery model: Personal Plan of Care    Patient completed Personal Plan of Care, identifying reasons for hospitalization and goals for discharge. Form reviewed in team meeting  by patient, physician, writer and RN. Form given to [facility] to be scanned into EPIC.    Survey provided.

## 2021-09-15 NOTE — DISCHARGE INSTRUCTIONS
Behavioral Discharge Planning and Instructions    Summary: You were admitted on 9/9/2021  due to Depression and Suicidal Ideations.  You were treated by Debra Naegele, APRN and discharged on 09/15/2021 from 4A to Home    Main Diagnosis:   Schizoaffective disorder, depressed.  Generalized anxiety disorder.  PTSD.    Health Care Follow-up:   Day Treatment Intake:   Appointment Date/Time: Monday, September 20th @ 1:00pm (assessment will be done over the phone. Intake staff will call you at the number we have on file [phone]-If this number is incorrect please call them with an updated phone number to reach you at)  Provider: [facility] at this time   Address: Adult Day Treatment program: [address] [place] [address]     Phone Number: [phone]    Psychiatry/Medication Management   Appointment Date/Time: Wednesday, September 29th @ 4:00pm   Psychiatrist: Dr. Valdemar Wolfe,      Address: [facility] Appointments     Phone [phone]  Fax Number:  [phone]    Individual Therapy:   Appointment Date/Time: Thursday, October 7th @ 1:00pm   Therapist: Marietta Silva MA,      Address: [facility]-Video appointments     Phone Number: [phone]    Fax Number: [phone]    Health [facility] Care Advocate/:   SAMSON Valdivia, [facility]  Inpatient Behavioral Health Case Management  Comprehensive Care Advocacy  Phone [phone]    Attend all scheduled appointments with your outpatient providers. Call at least 24 hours in advance if you need to reschedule an appointment to ensure continued access to your outpatient providers.     Major Treatments, Procedures and Findings:  You were provided with: a psychiatric assessment, assessed for medical stability, medication evaluation and/or management, group therapy and milieu management    Symptoms to Report: feeling more aggressive, increased confusion, losing more sleep,  "mood getting worse or thoughts of suicide    Early warning signs can include: increased depression or anxiety sleep disturbances increased thoughts or behaviors of suicide or self-harm  increased unusual thinking, such as paranoia or hearing voices    Safety and Wellness:  Take all medicines as directed.  Make no changes unless your doctor suggests them.      Follow treatment recommendations.  Refrain from alcohol and non-prescribed drugs.  Ask your support system to help you reduce your access to items that could harm yourself or others. Items could include:  Firearms  Medicines (both prescribed and over-the-counter)  Knives and other sharp objects  Ropes and like materials  Car keys  If there is a concern for safety, call 911. If there is a concern for safety, call 911.    Resources:   Crisis Intervention: 627.119.9471 or 055-674-0897 (TTY: 579.368.2005).  Call anytime for help.  National [place] on Mental Illness (www.[place].zoey.org): 569.861.5927 or 368-562-3320.  Suicide Awareness Voices of Education (SAVE) (www.save.org): 192-208-XFUN (2825)  National Suicide Prevention Line (www.mentalhealthmn.org): 658-627-HLEH (7281)  [facility] Rapid Response 299-561-6305  Text 4 Life: txt \"LIFE\" to 31810 for immediate support and crisis intervention  Crisis text line: Text \"[place]\" to 965225. Free, confidential, 24/7.    General Medication Instructions:   See your medication sheet(s) for instructions.   Take all medicines as directed.  Make no changes unless your doctor suggests them.   Go to all your doctor visits.  Be sure to have all your required lab tests. This way, your medicines can be refilled on time.  Do not use any drugs not prescribed by your doctor.  Avoid alcohol.    Advance Directives:   Scanned document on file with [employer]? No scanned doc  Is document scanned? Pt states no documents  Honoring Choices Your Rights Handout: Informed and given  Was more information offered? Materials given    The Treatment " team has appreciated the opportunity to work with you. If you have any questions or concerns about your recent admission, you can contact the unit which can receive your call 24 hours a day, 7 days a week. They will be able to get in touch with a Provider if needed. The unit number is [phone] .

## 2021-09-15 NOTE — PROGRESS NOTES
SPIRITUAL HEALTH SERVICES  SPIRITUAL ASSESSMENT Progress Note  [facility] ([facility] 4A     REFERRAL SOURCE: Pt follow up    I visited pt as follow up to spirituality group on 9/14.  Pt was willing to speak to me and shared she was anxious about her upcoming discharge and the uncertainty surrounding where she will go next.  Pt said she has coping skills to use to manage her anxiety and we explored what those were, including coloring, reading and talking to others.  Pt said that she likes to have a plan and know what will happen so the uncertainty about discharge is troubling her.  Pt shared her plans for continued self care following discharge, including taking a semester off and working less hours.  I praised pt for acknowledging the need to work on herself and to continue to focus on her mental health.      PLAN: Pt said she would reach out to [facility] with further needs.  [facility] will remain available.     Margaret Castillo  Pager: [phone]

## 2021-09-16 ENCOUNTER — PATIENT OUTREACH (OUTPATIENT)
Dept: CARE COORDINATION | Facility: CLINIC | Age: 21
End: 2021-09-16

## 2021-09-16 DIAGNOSIS — Z71.89 OTHER SPECIFIED COUNSELING: ICD-10-CM
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
## 2021-09-18 ENCOUNTER — HEALTH MAINTENANCE LETTER (OUTPATIENT)
Age: 21
End: 2021-09-18

## 2021-10-06 ENCOUNTER — TELEPHONE (OUTPATIENT)
Dept: DERMATOLOGY | Facility: CLINIC | Age: 21
End: 2021-10-06

## 2021-10-06 NOTE — TELEPHONE ENCOUNTER
Called patient and she stated that she did not request the refill, however she is going to talk to her psychiatrist about starting the Doxycycline again as she is having more acne breakouts again. Pt stated she would make appt. Called pharmacy and advised that pt did not want script and did not request it.   Sharee MERAZ RN BSN PHN  Specialty Clinics    
[facility] is requesting refill of Doxycycline Mono 100 mg # 60, please call [phone], not found on meds list  
.

## 2022-07-24 ENCOUNTER — APPOINTMENT (OUTPATIENT)
Dept: CT IMAGING | Facility: CLINIC | Age: 22
End: 2022-07-24
Attending: EMERGENCY MEDICINE
Payer: COMMERCIAL

## 2022-07-24 ENCOUNTER — HOSPITAL ENCOUNTER (EMERGENCY)
Facility: CLINIC | Age: 22
Discharge: HOME | End: 2022-07-25
Attending: EMERGENCY MEDICINE | Admitting: EMERGENCY MEDICINE
Payer: COMMERCIAL

## 2022-07-24 DIAGNOSIS — R10.10 UPPER ABDOMINAL PAIN: ICD-10-CM

## 2022-07-24 LAB
ALBUMIN SERPL-MCNC: 3.8 G/DL (ref 3.4–5)
ALBUMIN UR-MCNC: NEGATIVE MG/DL
ALP SERPL-CCNC: 102 U/L (ref 40–150)
ALT SERPL W P-5'-P-CCNC: 22 U/L (ref 0–50)
ANION GAP SERPL CALCULATED.3IONS-SCNC: 5 MMOL/L (ref 3–14)
APPEARANCE UR: CLEAR
AST SERPL W P-5'-P-CCNC: 18 U/L (ref 0–45)
BACTERIA #/AREA URNS HPF: ABNORMAL /HPF
BASOPHILS # BLD AUTO: 0 10E3/UL (ref 0–0.2)
BASOPHILS NFR BLD AUTO: 0 %
BILIRUB SERPL-MCNC: 0.2 MG/DL (ref 0.2–1.3)
BILIRUB UR QL STRIP: NEGATIVE
BUN SERPL-MCNC: 10 MG/DL (ref 7–30)
CALCIUM SERPL-MCNC: 8.6 MG/DL (ref 8.5–10.1)
CHLORIDE BLD-SCNC: 105 MMOL/L (ref 94–109)
CO2 SERPL-SCNC: 29 MMOL/L (ref 20–32)
COLOR UR AUTO: ABNORMAL
CREAT SERPL-MCNC: 0.57 MG/DL (ref 0.52–1.04)
EOSINOPHIL # BLD AUTO: 0.1 10E3/UL (ref 0–0.7)
EOSINOPHIL NFR BLD AUTO: 1 %
ERYTHROCYTE [DISTWIDTH] IN BLOOD BY AUTOMATED COUNT: 11.6 % (ref 10–15)
GFR SERPL CREATININE-BSD FRML MDRD: >90 ML/MIN/1.73M2
GLUCOSE BLD-MCNC: 84 MG/DL (ref 70–99)
GLUCOSE UR STRIP-MCNC: NEGATIVE MG/DL
HCG SERPL QL: NEGATIVE
HCT VFR BLD AUTO: 37.5 % (ref 35–47)
HGB BLD-MCNC: 12.2 G/DL (ref 11.7–15.7)
HGB UR QL STRIP: ABNORMAL
HOLD SPECIMEN: NORMAL
IMM GRANULOCYTES # BLD: 0 10E3/UL
IMM GRANULOCYTES NFR BLD: 0 %
KETONES UR STRIP-MCNC: NEGATIVE MG/DL
LEUKOCYTE ESTERASE UR QL STRIP: ABNORMAL
LIPASE SERPL-CCNC: 87 U/L (ref 73–393)
LYMPHOCYTES # BLD AUTO: 1.8 10E3/UL (ref 0.8–5.3)
LYMPHOCYTES NFR BLD AUTO: 30 %
MCH RBC QN AUTO: 32.1 PG (ref 26.5–33)
MCHC RBC AUTO-ENTMCNC: 32.5 G/DL (ref 31.5–36.5)
MCV RBC AUTO: 99 FL (ref 78–100)
MONOCYTES # BLD AUTO: 0.5 10E3/UL (ref 0–1.3)
MONOCYTES NFR BLD AUTO: 9 %
NEUTROPHILS # BLD AUTO: 3.6 10E3/UL (ref 1.6–8.3)
NEUTROPHILS NFR BLD AUTO: 60 %
NITRATE UR QL: NEGATIVE
NRBC # BLD AUTO: 0 10E3/UL
NRBC BLD AUTO-RTO: 0 /100
PH UR STRIP: 7.5 [PH] (ref 5–7)
PLATELET # BLD AUTO: 270 10E3/UL (ref 150–450)
POTASSIUM BLD-SCNC: 3.8 MMOL/L (ref 3.4–5.3)
PROT SERPL-MCNC: 7.4 G/DL (ref 6.8–8.8)
RBC # BLD AUTO: 3.8 10E6/UL (ref 3.8–5.2)
RBC URINE: 1 /HPF
SODIUM SERPL-SCNC: 139 MMOL/L (ref 133–144)
SP GR UR STRIP: 1.01 (ref 1–1.03)
SQUAMOUS EPITHELIAL: 6 /HPF
UROBILINOGEN UR STRIP-MCNC: NORMAL MG/DL
WBC # BLD AUTO: 6 10E3/UL (ref 4–11)
WBC URINE: 1 /HPF

## 2022-07-24 PROCEDURE — 99285 EMERGENCY DEPT VISIT HI MDM: CPT | Mod: 25 | Performed by: EMERGENCY MEDICINE

## 2022-07-24 PROCEDURE — 250N000009 HC RX 250: Performed by: EMERGENCY MEDICINE

## 2022-07-24 PROCEDURE — 74177 CT ABD & PELVIS W/CONTRAST: CPT

## 2022-07-24 PROCEDURE — 258N000003 HC RX IP 258 OP 636: Performed by: EMERGENCY MEDICINE

## 2022-07-24 PROCEDURE — 36415 COLL VENOUS BLD VENIPUNCTURE: CPT | Performed by: EMERGENCY MEDICINE

## 2022-07-24 PROCEDURE — 99285 EMERGENCY DEPT VISIT HI MDM: CPT | Performed by: EMERGENCY MEDICINE

## 2022-07-24 PROCEDURE — 96376 TX/PRO/DX INJ SAME DRUG ADON: CPT | Performed by: EMERGENCY MEDICINE

## 2022-07-24 PROCEDURE — 81001 URINALYSIS AUTO W/SCOPE: CPT | Performed by: EMERGENCY MEDICINE

## 2022-07-24 PROCEDURE — 80053 COMPREHEN METABOLIC PANEL: CPT | Performed by: EMERGENCY MEDICINE

## 2022-07-24 PROCEDURE — 96361 HYDRATE IV INFUSION ADD-ON: CPT | Performed by: EMERGENCY MEDICINE

## 2022-07-24 PROCEDURE — 96375 TX/PRO/DX INJ NEW DRUG ADDON: CPT | Performed by: EMERGENCY MEDICINE

## 2022-07-24 PROCEDURE — 85025 COMPLETE CBC W/AUTO DIFF WBC: CPT | Performed by: EMERGENCY MEDICINE

## 2022-07-24 PROCEDURE — 250N000011 HC RX IP 250 OP 636: Performed by: EMERGENCY MEDICINE

## 2022-07-24 PROCEDURE — 83690 ASSAY OF LIPASE: CPT | Performed by: EMERGENCY MEDICINE

## 2022-07-24 PROCEDURE — 96374 THER/PROPH/DIAG INJ IV PUSH: CPT | Mod: 59 | Performed by: EMERGENCY MEDICINE

## 2022-07-24 PROCEDURE — 84703 CHORIONIC GONADOTROPIN ASSAY: CPT | Performed by: EMERGENCY MEDICINE

## 2022-07-24 RX ORDER — ONDANSETRON 2 MG/ML
4 INJECTION INTRAMUSCULAR; INTRAVENOUS ONCE
Status: COMPLETED | OUTPATIENT
Start: 2022-07-24 | End: 2022-07-24

## 2022-07-24 RX ORDER — HYDROMORPHONE HYDROCHLORIDE 1 MG/ML
0.3 INJECTION, SOLUTION INTRAMUSCULAR; INTRAVENOUS; SUBCUTANEOUS ONCE
Status: COMPLETED | OUTPATIENT
Start: 2022-07-24 | End: 2022-07-24

## 2022-07-24 RX ORDER — DIPHENHYDRAMINE HYDROCHLORIDE 50 MG/ML
INJECTION INTRAMUSCULAR; INTRAVENOUS
Status: DISCONTINUED
Start: 2022-07-24 | End: 2022-07-25 | Stop reason: HOSPADM

## 2022-07-24 RX ORDER — IOPAMIDOL 755 MG/ML
54 INJECTION, SOLUTION INTRAVASCULAR ONCE
Status: COMPLETED | OUTPATIENT
Start: 2022-07-24 | End: 2022-07-24

## 2022-07-24 RX ORDER — DIPHENHYDRAMINE HYDROCHLORIDE 50 MG/ML
25 INJECTION INTRAMUSCULAR; INTRAVENOUS ONCE
Status: COMPLETED | OUTPATIENT
Start: 2022-07-24 | End: 2022-07-24

## 2022-07-24 RX ADMIN — DIPHENHYDRAMINE HYDROCHLORIDE 25 MG: 50 INJECTION, SOLUTION INTRAMUSCULAR; INTRAVENOUS at 22:57

## 2022-07-24 RX ADMIN — ONDANSETRON 4 MG: 2 INJECTION INTRAMUSCULAR; INTRAVENOUS at 20:05

## 2022-07-24 RX ADMIN — IOPAMIDOL 54 ML: 755 INJECTION, SOLUTION INTRAVENOUS at 21:45

## 2022-07-24 RX ADMIN — PROCHLORPERAZINE EDISYLATE 10 MG: 5 INJECTION INTRAMUSCULAR; INTRAVENOUS at 22:11

## 2022-07-24 RX ADMIN — HYDROMORPHONE HYDROCHLORIDE 0.3 MG: 1 INJECTION, SOLUTION INTRAMUSCULAR; INTRAVENOUS; SUBCUTANEOUS at 20:05

## 2022-07-24 RX ADMIN — DIPHENHYDRAMINE HYDROCHLORIDE 25 MG: 50 INJECTION, SOLUTION INTRAMUSCULAR; INTRAVENOUS at 23:37

## 2022-07-24 RX ADMIN — SODIUM CHLORIDE 500 ML: 9 INJECTION, SOLUTION INTRAVENOUS at 20:05

## 2022-07-24 RX ADMIN — SODIUM CHLORIDE 55 ML: 9 INJECTION, SOLUTION INTRAVENOUS at 21:45

## 2022-07-24 NOTE — ED TRIAGE NOTES
Upper abd pain.  Nausea. Hx of liver disease.     Triage Assessment     Row Name 07/24/22 2789       Triage Assessment (Adult)    Airway WDL WDL       Respiratory WDL    Respiratory WDL WDL       Skin Circulation/Temperature WDL    Skin Circulation/Temperature WDL WDL       Cardiac WDL    Cardiac WDL WDL       Peripheral/Neurovascular WDL    Peripheral Neurovascular WDL WDL       Cognitive/Neuro/Behavioral WDL    Cognitive/Neuro/Behavioral WDL WDL

## 2022-07-25 VITALS
DIASTOLIC BLOOD PRESSURE: 78 MMHG | WEIGHT: 123 LBS | TEMPERATURE: 98.5 F | OXYGEN SATURATION: 97 % | BODY MASS INDEX: 21 KG/M2 | SYSTOLIC BLOOD PRESSURE: 108 MMHG | HEART RATE: 89 BPM | HEIGHT: 64 IN

## 2022-07-25 PROCEDURE — 250N000011 HC RX IP 250 OP 636: Performed by: EMERGENCY MEDICINE

## 2022-07-25 RX ORDER — LORAZEPAM 2 MG/ML
1 INJECTION INTRAMUSCULAR ONCE
Status: COMPLETED | OUTPATIENT
Start: 2022-07-25 | End: 2022-07-25

## 2022-07-25 RX ADMIN — LORAZEPAM 1 MG: 2 INJECTION INTRAMUSCULAR; INTRAVENOUS at 00:39

## 2022-07-25 NOTE — ED PROVIDER NOTES
History     Chief Complaint   Patient presents with     Abdominal Pain     HPI  Rafaela Mckeon is a 21 year old female with past medical history significant for anxiety, you will stand loss POTS syndrome schizoaffective disorder, history of elevated transaminase levels with cholecystectomy.  Patient presents the emergency department complaining of upper abdominal pain.  Patient states pain began as a mild pain and after eating this afternoon she began having more severe pain.  Pain does not radiate she has not had fevers or chills she is nauseated denies any chest pain or shortness of breath.  She has had normal bowel movements denies lower abdominal pain.  She denies focal numbness weakness in extremity and has not had any bowel or bladder dysfunction.    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     Priority: Medium     Severe recurrent major depression without psychotic features (H) 03/02/2019     Priority: Medium     Abnormal hearing screen 10/09/2018     Priority: Medium     Suicidal ideation 02/02/2018     Priority: Medium     Generalized anxiety disorder 09/28/2017     Priority: Medium        Past Medical History:    Past Medical History:   Diagnosis Date     Foreign-Danlos syndrome      Postural orthostatic tachycardia syndrome        Past Surgical History:    Past Surgical History:   Procedure Laterality Date     CHOLECYSTECTOMY         Family History:    Family History   Problem Relation Age of Onset     Allergies Mother         Seasonal     Allergies Sister         Seasonal       Social History:  Marital Status:  Single [1]  Social History  "    Tobacco Use     Smoking status: Never Smoker     Smokeless tobacco: Never Used   Substance Use Topics     Alcohol use: No     Drug use: No        Medications:    albuterol (PROAIR HFA/PROVENTIL HFA/VENTOLIN HFA) 108 (90 Base) MCG/ACT inhaler  benzoyl peroxide 5 % external liquid  ciclopirox (LOPROX) 0.77 % cream  clonazePAM (KLONOPIN) 0.5 MG tablet  etonogestrel (NEXPLANON) 68 MG IMPL  fluocinonide (LIDEX) 0.05 % external solution  FLUoxetine (PROZAC) 10 MG capsule  gabapentin (NEURONTIN) 300 MG capsule  hydrOXYzine (VISTARIL) 25 MG capsule  mirtazapine (REMERON) 15 MG tablet  QUEtiapine (SEROQUEL) 50 MG tablet  QUEtiapine ER (SEROQUEL XR) 400 MG 24 hr tablet  tretinoin (RETIN-A) 0.05 % external cream          Review of Systems  All systems reviewed and other than pertinent positives and negatives in HPI all other systems are negative.  Physical Exam   BP: 107/74  Pulse: 102  Temp: 98.5  F (36.9  C)  Height: 162.6 cm (5' 4\")  Weight: 55.8 kg (123 lb)  SpO2: 99 %      Physical Exam  Vitals and nursing note reviewed.   Constitutional:       General: She is not in acute distress.     Appearance: Normal appearance. She is not ill-appearing, toxic-appearing or diaphoretic.   HENT:      Head: Normocephalic and atraumatic.      Nose: Nose normal.      Mouth/Throat:      Mouth: Mucous membranes are moist.      Pharynx: Oropharynx is clear.   Eyes:      Conjunctiva/sclera: Conjunctivae normal.   Cardiovascular:      Rate and Rhythm: Normal rate and regular rhythm.      Pulses: Normal pulses.      Heart sounds: Normal heart sounds. No murmur heard.  Pulmonary:      Effort: Pulmonary effort is normal.      Breath sounds: Normal breath sounds. No stridor. No wheezing or rhonchi.   Abdominal:      General: Abdomen is flat. Bowel sounds are normal. There is no distension.      Palpations: Abdomen is soft.      Comments: Tenderness to palpation in the right upper quadrant and epigastric region are present.  No lower abdominal " tenderness present.  There is no flank pain present.   Musculoskeletal:         General: No swelling or tenderness. Normal range of motion.      Cervical back: Normal range of motion and neck supple.      Right lower leg: No edema.      Left lower leg: No edema.   Skin:     General: Skin is warm and dry.      Findings: No rash.   Neurological:      General: No focal deficit present.      Mental Status: She is alert and oriented to person, place, and time.      Sensory: No sensory deficit.      Motor: No weakness.      Coordination: Coordination normal.   Psychiatric:         Mood and Affect: Mood normal.         ED Course                 Procedures              Critical Care time:  none               No results found for this or any previous visit (from the past 24 hour(s)).    Medications   0.9% sodium chloride BOLUS (has no administration in time range)   ondansetron (ZOFRAN) injection 4 mg (has no administration in time range)   HYDROmorphone (PF) (DILAUDID) injection 0.3 mg (has no administration in time range)     Results for orders placed or performed during the hospital encounter of 07/24/22   CT Abdomen Pelvis w Contrast    Narrative    EXAM: CT ABDOMEN PELVIS W CONTRAST  LOCATION: [facility]  DATE/TIME: 7/24/2022 9:44 PM    INDICATION: Right upper quadrant and epigastric pain. History of cholecystectomy.  COMPARISON: 04/25/2022  TECHNIQUE: CT scan of the abdomen and pelvis was performed following injection of IV contrast. Multiplanar reformats were obtained. Dose reduction techniques were used.  CONTRAST: 54 mL Isovue 370    FINDINGS:   LOWER CHEST: Unremarkable.    HEPATOBILIARY: Focal hepatic steatosis adjacent to the falciform ligament. Status post cholecystectomy. No biliary dilatation.    PANCREAS: Normal.    SPLEEN: Normal.    ADRENAL GLANDS: Normal.    KIDNEYS/BLADDER: Normal.    BOWEL: No obstruction or inflammatory change. Normal appendix. No evidence of acute  appendicitis.    LYMPH NODES: No lymphadenopathy.    VASCULATURE: Unremarkable.    PELVIC ORGANS: Unremarkable.    MUSCULOSKELETAL: Tiny fat-containing periumbilical hernia.      Impression    IMPRESSION:   1.  No acute findings or inflammatory changes in the abdomen or pelvis.    2.  Status post cholecystectomy. No biliary dilatation.         Assessments & Plan (with Medical Decision Making) records were reviewed.  Labs were obtained.  Patient was given Dilaudid for pain and Zofran for nausea full bolus was given to patient.  CBC without significant abnormality.  Comprehensive metabolic panel without significant abnormality.  Pregnancy test was negative lipase 87 UA without evidence of infection.  Due to patient's abdominal pain risks and benefits of CT scan abdomen pelvis were discussed with the patient and this was obtained.  CT scan without significant abnormality.  Patient complaining of continued nausea and stated Compazine usually worked for her.  I therefore gave her a dose of Compazine and she had improvement of her nausea but became very anxious and restless.  She was given Benadryl a total of 50 mg we are planning to observe the patient and see her response if no resolution of her symptoms with this we will give Ativan 0.5 mg IV.  Patient will be signed out to Dr. Hood for evaluation of patient.     I have reviewed the nursing notes.    I have reviewed the findings, diagnosis, plan and need for follow up with the patient.       Discharge Medication List as of 7/25/2022  1:49 AM          Final diagnoses:   Upper abdominal pain       7/24/2022   [facility] EMERGENCY DEPT     Jourdan, Braulio Gusman MD  07/26/22 [id number]

## 2022-07-25 NOTE — DISCHARGE INSTRUCTIONS
Return if symptoms worsen or new symptoms develop.  Follow-up with primary care physician this week for recheck.  Drink plenty of fluids.  Continue omeprazole daily and you can add occasional milk of magnesia or Tums.  If increased abdominal pain vomiting or other symptoms present please return for further evaluation and care.

## 2022-08-05 ENCOUNTER — BEH TREATMENT PLAN (OUTPATIENT)
Dept: BEHAVIORAL HEALTH | Facility: CLINIC | Age: 22
End: 2022-08-05
Attending: PSYCHIATRY & NEUROLOGY

## 2022-08-05 ENCOUNTER — TELEPHONE (OUTPATIENT)
Dept: BEHAVIORAL HEALTH | Facility: CLINIC | Age: 22
End: 2022-08-05

## 2022-08-05 ENCOUNTER — HOSPITAL ENCOUNTER (OUTPATIENT)
Dept: BEHAVIORAL HEALTH | Facility: CLINIC | Age: 22
Discharge: HOME | End: 2022-08-05
Attending: FAMILY MEDICINE | Admitting: FAMILY MEDICINE
Payer: COMMERCIAL

## 2022-08-05 DIAGNOSIS — F25.1 SCHIZOAFFECTIVE DISORDER, DEPRESSIVE TYPE (H): ICD-10-CM

## 2022-08-05 PROCEDURE — 90791 PSYCH DIAGNOSTIC EVALUATION: CPT | Mod: GT,95 | Performed by: COUNSELOR

## 2022-08-05 RX ORDER — PRAZOSIN HYDROCHLORIDE 5 MG/1
5 CAPSULE ORAL AT BEDTIME
COMMUNITY

## 2022-08-05 RX ORDER — DOXYCYCLINE HYCLATE 50 MG/1
50 CAPSULE ORAL 2 TIMES DAILY
COMMUNITY

## 2022-08-05 RX ORDER — LAMOTRIGINE 100 MG/1
100 TABLET ORAL DAILY
COMMUNITY

## 2022-08-05 ASSESSMENT — COLUMBIA-SUICIDE SEVERITY RATING SCALE - C-SSRS
5. HAVE YOU STARTED TO WORK OUT OR WORKED OUT THE DETAILS OF HOW TO KILL YOURSELF? DO YOU INTEND TO CARRY OUT THIS PLAN?: YES
3. HAVE YOU BEEN THINKING ABOUT HOW YOU MIGHT KILL YOURSELF?: YES
6. HAVE YOU EVER DONE ANYTHING, STARTED TO DO ANYTHING, OR PREPARED TO DO ANYTHING TO END YOUR LIFE?: YES
1. IN THE PAST MONTH, HAVE YOU WISHED YOU WERE DEAD OR WISHED YOU COULD GO TO SLEEP AND NOT WAKE UP?: NO
2. HAVE YOU ACTUALLY HAD ANY THOUGHTS OF KILLING YOURSELF IN THE PAST MONTH?: YES
4. HAVE YOU HAD THESE THOUGHTS AND HAD SOME INTENTION OF ACTING ON THEM?: YES

## 2022-08-05 ASSESSMENT — ANXIETY QUESTIONNAIRES
8. IF YOU CHECKED OFF ANY PROBLEMS, HOW DIFFICULT HAVE THESE MADE IT FOR YOU TO DO YOUR WORK, TAKE CARE OF THINGS AT HOME, OR GET ALONG WITH OTHER PEOPLE?: SOMEWHAT DIFFICULT
GAD7 TOTAL SCORE: 12
GAD7 TOTAL SCORE: 12
IF YOU CHECKED OFF ANY PROBLEMS ON THIS QUESTIONNAIRE, HOW DIFFICULT HAVE THESE PROBLEMS MADE IT FOR YOU TO DO YOUR WORK, TAKE CARE OF THINGS AT HOME, OR GET ALONG WITH OTHER PEOPLE: SOMEWHAT DIFFICULT
7. FEELING AFRAID AS IF SOMETHING AWFUL MIGHT HAPPEN: SEVERAL DAYS
5. BEING SO RESTLESS THAT IT IS HARD TO SIT STILL: SEVERAL DAYS
6. BECOMING EASILY ANNOYED OR IRRITABLE: MORE THAN HALF THE DAYS
4. TROUBLE RELAXING: SEVERAL DAYS
3. WORRYING TOO MUCH ABOUT DIFFERENT THINGS: MORE THAN HALF THE DAYS
1. FEELING NERVOUS, ANXIOUS, OR ON EDGE: MORE THAN HALF THE DAYS
2. NOT BEING ABLE TO STOP OR CONTROL WORRYING: NEARLY EVERY DAY
GAD7 TOTAL SCORE: 12
7. FEELING AFRAID AS IF SOMETHING AWFUL MIGHT HAPPEN: SEVERAL DAYS

## 2022-08-05 ASSESSMENT — PATIENT HEALTH QUESTIONNAIRE - PHQ9
SUM OF ALL RESPONSES TO PHQ QUESTIONS 1-9: 15
SUM OF ALL RESPONSES TO PHQ QUESTIONS 1-9: 15
10. IF YOU CHECKED OFF ANY PROBLEMS, HOW DIFFICULT HAVE THESE PROBLEMS MADE IT FOR YOU TO DO YOUR WORK, TAKE CARE OF THINGS AT HOME, OR GET ALONG WITH OTHER PEOPLE: VERY DIFFICULT

## 2022-08-05 NOTE — ADDENDUM NOTE
Encounter addended by: Valeria Nuñez [facility] on: 8/5/2022 1:15 PM   Actions taken: Clinical Note Signed

## 2022-08-05 NOTE — PATIENT INSTRUCTIONS
MY COPING PLAN FOR SAFETY        Name: Rafaela Mckeon  YOB: 2000  Date: August 5, 2022   My primary care provider: Fartun Arriaga  [facility]  My primary care clinic:   My prescriber:  Ladarius Cooney.  Next appointment: in 2 weeks   Other care team support:  psychotherapy with Brittney Manuel with [facility]    My Triggers:  My PTSD      Additional People, Places, and Things that I can access for support: my family, my provider         What is important to me and makes life worth living: being with my family and my friends.         GREEN    Good Control  1. I feel good  2. No suicidal thoughts   3. Can work, sleep and play      Action Steps  1. Self-care: balanced meals, exercising, sleep practices, etc.  2. Take your medications as prescribed.  3. Continue meetings with therapist and prescriber.  4.  Do the healthy things that I enjoy.             YELLOW  Getting Worse  I have ANY of these:  1. I do not feel good  2. Difficulty Concentrating  3. Sleep is changing  4. Increase/Change in my thoughts to hurt self and/or others, but I can still manage and not act on it.   5. Not taking care of self.               Action Steps (in addition to the above):  1. Inform your therapist and psychiatric prescriber/PCP.  2. Keep taking your medications as prescribed.    3. Turn to people you can ask for help.  4. Use internal coping strategies -see below.  5. Create safe environment: lock and limit medications, notify friends/family of increase in symptoms and reduce means to other identified method             RED  Get Help  If I have ANY of these:  1. Current and uncontrollable thoughts and/or behaviors to hurt self and/or others.      Actions to manage my safety  1. Contact your emergency person Monika Ho (mother) [phone]  2. Call my crisis team- [facility] [phone]  3. Or Call 911 or go to the emergency room right away          My Internal Coping Strategies include the  following:  blow bubbles, belly breathing, arts and crafts, color, chew gum, fidget toys and use my coping skills    [Safety Plan Provided via [employer]]     Safety Concerns  How To Identify Situations That Make Your Mental Health Worse:  Triggers are things that make your mental health worse.  Look at the list below to help you find your triggers and what you can do about them.     1. Identify Early Warning Signs:    Sometimes symptoms return, even when people do their best to stay well. Symptoms can develop over a short period of time with little or no warning, but most of the time they emerge gradually over several weeks.  Early warning signs are changes that people experience when a relapse is starting. Some early warning signs are common and others are not as common.   Common Early Warning Signs:    Feeling tense or nervous, Eating less or eating more, Trouble sleeping -either too much or too little sleep, Feeling depressed or low, Feeling irritable, Feeling like not being around other people, Trouble concentrating and Urges to harm self     2. Identify action steps to take when warning signs are noticed:    Taking Action- It is important to take action if you are experiencing early warning signs of a relapse.  The faster you act, the more likely it is that you can avoid a full relapse.  It is helpful to identify several specific ways to cope with symptoms.      The following is my list of symptoms and coping strategies that I can use when they are present:    Symptom Coping Strategies   Anxiety -Talk with someone in your support system and let him or her know how you are feeling.  -Use relaxation techniques such as deep breathing or imagery.  -Use positive affirmations to counteract negative self-talk such as  I am learning to let go of worry.    Depression - Schedule your day; include activities you have to do and activities you enjoy doing.  - Get some exercise - walk, run, bike, or swim.  - Give yourself  credit for even the smallest things you get done.   Sleep Difficulties   - Go to sleep at the same time every day.  - Do something relaxing before bed, such as drinking herbal tea or listening to music.  - Avoid having discussions about upsetting topics before going to bed.   Delusions   - Distract yourself from the disturbing thought by doing something that requires your attention such as a puzzle.  - Check out your beliefs by talking to someone you trust.    Hallucinations   - Use headphones to listen to music.  - Tell voices to  stop  or say to yourself,  I am safe.   - Ignore the hallucinations as much as possible; focus on other things.   Concentration Difficulties - Minimize distractions so there is only one thing for you to focus on at a time.    - Ask the person you are having a conversation with to slow down or repeat things you are unsure of.      Welcome to [facility] Adult Mental Health Outpatient Programs    Thank you for choosing [facility]!    Congratulations! You have completed the first step in your recovery by participating in a Diagnostic Assessment. With your input, Valeria Nuñez, [facility], LADC, is recommending the following level of care and services to best meet your needs.    Managing mental health symptoms while balancing life stressors can be difficult. Our mental health programming will provide the group therapy, education, skills, and support needed to improve your well-being while living a healthy and manageable lifestyle.    All our Adult Mental Health Outpatient Programs are group-based and allow you to meet with peers who are trying to manage similar symptoms and/or life circumstances in a safe and therapeutic setting.    Recommendations and Plan:    Level of Care:  Adult Day Treatment Program (ADT)    Start Date:  August / 08 / 2022    Schedule:  ADTschedule: 1A: Monday, Wednesday, Friday @ 9 AM-11:50 AM    If you were placed on a Wait List following your Diagnostic  Assessment, please expect the following:  You will receive a phone call from the program within a few days to discuss a start date and plan.    Please contact the program at the number listed above if you are choosing to be removed from the Wait List, need to reschedule your start or if you have any additional questions.    Type of Participation:  Virtual     We are currently providing 100% online group-based programming. It is a requirement that you be physically in the State of [place] when accessing services. Our providers must be licensed in the state you are located in.      To provide the best group experience for everyone, we expect confidentiality, regular attendance, and respectful participation by all.      Cameras need to be on during groups. We want to see you!   Be sure to be in a private place where others will not overhear or walk in. Using headphones and making sure your screen is not visible to others are steps you can take to ensure confidentiality.   What is said in group, stays in group. (All personal or identifying information shared in group should be kept confidential and not shared with anyone).    Zoom may automatically show your first and last name unless you change it when logging into group. We encourage you to change your name to your first or preferred name. You may also include preferred pronouns.   Please be sitting upright in a comfortable position. This will maximize engagement and participation for everyone.   Please refrain from smoking, eating, driving, or engaging in other distracting activities during groups.  Facilitators may provide reminders of the above expectations during group as needed.    If your camera is off and you are not responding to prompts, facilitators will assume you have left and place you in the waiting room. You will need to request to return to group.    Please do NOT cancel your appointments through [employer].  If you are going to be absent, please contact the  program number and leave a message so staff will not expect you.   You will NOT be billed for any sessions you do not attend.    See additional attendance and program participation information below.     Accessing Virtual Groups:    The best way to attend groups is through your [employer] account. Please ask staff if you need assistance setting up an account. [employer] HELPLINE: [phone] or [employer] - Login Page ([employer]).  You will also need to download Zoom Download for Windows - Zoom to your computer (preferred), phone or iPad/Tablet devise. It is NOT necessary to log into or set up a Zoom account.  Log into My Chart each day before group.   Go to the  Visits  tab and select the current date.    You will be asked to verify personal information on the first day or for longer programs, every 30 days. Please allow extra time on your first day to complete this. You will also be asked to complete assessments regularly so we can monitor your progress and address concerns.    The daily invite through [employer] expires 15 minutes after group starts.   You will need to call the program number to request a link to the group if you:   log out of group once it begins   are late to group   get disconnected   are unable to access group for any reason    There is a new link created every day.    Breaks are provided between groups (10 minutes)   Do not log out.  You may mute or pause your video.   The group facilitator may put you in virtual waiting room until the next group starts.        Adult Day Treatment (ADT) Program Overview [phone]    Adult Day Treatment Program Overview:   This level of care is less intensive than an inpatient or partial hospitalization program and provides more support than traditional individual psychotherapy.     Length of Stay Typically, patients attend up to 12 weeks of programming, although this can vary depending on specific patient needs.   Treatment team Multi-disciplinary team includes  a licensed psychotherapist, registered nurse, and occupational therapist.     Group-based programming 3 groups per day; 3 days per week  50 minutes per group  10-minute break between each group  Facilitated by a member of the treatment team    Group Psychotherapy is provided daily, allowing time to check-in, process concerns and share feedback/support. All other groups include a topic and provide opportunities for education, skill building, discussion, and support.      Example Group Topics Admission IOP topics are listed above and will align with additional group topics covered throughout the 12-week combined programming.     Topics may include:    Behavior Activation, Cognitive Restructuring: Cognitive Distortions, Communication Skills/ Areas for Self-Improvement. Coping Skills, Discharge Planning, Dimensions of Wellness, Emotions, Forgiveness, Functional Nutrition, Grief, Life Transitions, Leisure Exploration, Life Skills, Medication Assessment, Mental Health Social Support, Mindfulness, Mood Tracking/Triggers, Motivation and Procrastination, Relationship, Relaxation Techniques, Self-Awareness, Self-Confidence, Self-Care, Self-Compassion, Shame and Guilt, Sleep hygiene, SMART Goals, Stages to Huger with Stress, Stigma, Strategies to Improve Motivation, Symptom Awareness, Time Management, Trauma Triggers, Values, Wellness     Psychiatrist Available for Treatment Team consultation. Patients are encouraged to continue with their outpatient/community providers.   Our Nursing team will partner with you to triage medication questions/concerns.  Patients who need bridging services between providers may request a visit, and we will coordinate when available.   The psychiatrist will partner with you and your other providers for medication management, as needed.   When available, Psychiatry services are billed separately.   Individual Therapy Not provided in this program.   Physical Health Screen Patients meet with a  program nurse within the first week to complete a brief physical health screen. This is a program requirement.     FMLA or Short-term Disability We encourage you to request completion of paperwork from your long-term providers.   If this is not an option, please notify the program nurse as soon as possible.  We will do our best to help you coordinate completion of paperwork.   Medical Record requests: please contact Release of Information  at [phone] and allow up to 14 days for records once the authorization for release is received.    Treatment and Discharge Planning Patients meet individually with team members for treatment planning.   We will help you develop goals and identify strengths and/or barriers.   We will discuss your program participation, progress, and discharge planning.   We are available to assist with referrals and service coordination; please let us know how best we can support your specific needs.   You will receive copies of your treatment plan and discharge summary via [employer].            An Important Note from your Program Treatment Team...  Welcome! We are happy to be partnering with you on your recovery journey. Our experienced clinicians have developed programming based on current research and evidence-based practice to provide you with high quality mental health treatment.     Attendance and Program Participation:  You will participate in a variety of groups each day. Our goal is to provide you with a rich and varied therapeutic healing environment knowing patients have unique experiences and preferred ways of sharing, learning, processing, and engaging in the recovery process.  You are expected to attend all groups on time.  If you are going to be late or absent, please let a team member know the reason. You can also leave that same information at the number listed above.  In the event of an unreported absence, we will reach out to you. If we are unable to reach you, know that we may  call your emergency contact.  We always attempt to establish contact with your emergency contact prior to initiating a wellness check.  While it is important that you continue to attend appointments with your individual therapist, psychiatrist, and other medical providers, you are encouraged to schedule these appointments outside of group hours whenever possible.  If your attendance becomes a concern, your treatment team will have a discussion with you and may start an Attendance Agreement. Following your Attendance Agreement is required to prevent early discharge from the program.  To get the most out of the program and to support your wellbeing we require that you do not use any chemicals, tobacco or vape products on screen or during program hours. The team is available to assist you if this is something you struggle with.  Please be mindful that you are part of a group; therefore, we ask that you be respectful of other group members' needs and do not use derogatory, offensive, or discriminatory language.  You will be removed from group if suspected of being under the influence of substances or if using language that negatively impacts the group.  Your treatment team will address any concerns with you and offer recommendations. A Behavior Agreement may be started. Following your Behavior Agreement is required to prevent early discharge from the program.  Communication with other group members outside of group is discouraged. This can affect your treatment and the way the group functions.  If you choose to share contact information with group peers AFTER you are discharged from the program, this decision is completely independent of any program coordination or support.  While in the program, participants may not engage in any sexual or intimate relationships with each other outside of group. This may result in immediate discharge of both participants from the program.   Trauma:  Many of our patients have  experienced trauma. You are not alone. This can be challenging for patients to manage. All our team members have been trained in Trauma Informed Care and will provide you with the education, skills training, and support that you need to stabilize your symptoms.  Specific details and descriptions of abuse, assault, violence, neglect, etc., are best processed in individual therapy as to avoid triggering other group members.  Discussing how traumatic experiences have impacted beliefs about self/others/world and practicing skills to cope with symptoms is very appropriate for group therapy.     We look forward to working together to support your mental health.     We love feedback from our patients about our program!  Please take a few moments to respond to surveys sent out by [employer].  This will help us continue to make improvements and to keep the things that are   important to you!
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
## 2022-08-05 NOTE — PROGRESS NOTES
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 and numbing of general responsiveness (not present before the trauma), as indicated by three (or more) of the following:     - Efforts to avoid thoughts, feelings, or conversations associated with the trauma.   D. Persistent symptoms of increased arousal (not present before the trauma), as indicated by two (or more) of the following:     - Difficulty falling or staying asleep.      - Irritability or outbursts of anger.      - Difficulty concentrating.      - Hypervigilance.   E. Duration of the disturbance is more than 1 month.  F. The disturbance causes clinically significant distress or impairment in social, occupational, or other important areas of functioning.      Functional Status:  Patient reports the following functional impairments:  relationship(s) and social interactions.     Programmatic care:  Current WHODAS was assigned and patient needs the following level of care based on score 28  .    Clinical Summary:  1. Reason for assessment: Additional support.  2. Psychosocial, Cultural and Contextual Factors: None.  3. Principal DSM5 Diagnoses  (Sustained by DSM5 Criteria Listed  Above):   295.70  (F25.1) Schizoaffective Disorder Depressive Type  309.81 (F43.10) Posttraumatic Stress Disorder (includes Posttraumatic Stress Disorder for Children 6 Years and Younger)  With dissociative symptoms.  4. Other Diagnoses that is relevant to services:   296.32 (F33.1) Major Depressive Disorder, Recurrent Episode, Moderate _  300.02 (F41.1) Generalized Anxiety Disorder  300.3 (F42) Unspecified Obsessive Compulsive and Related Disorder  307.50 (F50.9) Unspecified Feeding or Eating Disorder.  5. Provisional Diagnosis: None  6. Prognosis: Relieve Acute Symptoms.  7. Likely consequences of symptoms if not treated: If untreated, patient's mental health will likely deteriorate and may require a higher level of care.  8. Client strengths include:  has a previous history of therapy, open to learning and support of family, friends and providers .     Recommendations:     1. Plan for Safety and Risk Management:   Safety and Risk: A safety and risk management plan has been developed including: Patient consented to co-developed safety plan on 08/05/2022.  Safety and risk management plan was reviewed.   Patient agreed to use safety plan should any safety concerns arise.  A copy was made available to the patient..          Report to child / adult protection services was NA.     2. Patient's identified None.     3. Initial Treatment will focus on:    Depressed Mood   Anxiety   Mood Instability .     4. Resources/Service Plan:    services are not indicated.   Modifications to assist communication are not indicated.   Additional disability accommodations are not indicated.      5. Collaboration:   Collaboration / coordination of treatment will be initiated with the following  support professionals: None.      6.  Referrals:   The following referral(s) will be initiated: Day Treatment. Next Scheduled Appointment: 08/08/2022.     A Release of Information has been obtained for the following: None.     Emergency  Contact Monika Ho (mother) [phone] was obtained.     7. ARI:    ARI:  Discussed the general effects of drugs and alcohol on health and well-being. Recommendations: pt does not use substances and never has .     8. Records:   These were reviewed at time of assessment. Information in this assessment was obtained from the medical record and provided by patient who is a good historian. Patient will have open access to their mental health medical record.    Clinical Substantiation: Summary: Patient is a 21 year old female with a history of depression, anxiety, PTSD, Schizoaffective disorder and eating disorder NOS  presents for an assessment of mental health needs. Patient has a history of multiple psychiatric hospitalizations. Patient has a history of SI, SA or SIB. Denies any concerns with current alcohol use. Patient recently completed a program and feels that she needs more support.The patient's acute suicide risk was determined to be high due to the following factors:suicidal thoughts, suicide attempts and SIB. Patient is not currently under the influence of alcohol or illicit substances, denies experiencing command hallucinations, and has no immediate access to firearms. The patient's acute risk could be higher if noncompliant with their treatment plan, medications, follow-up appointments or using illicit substances or alcohol. Protective factors include:dedication to family or friends; help seeking behaviors when distressed; adherence with prescribed medication; agreement to use safety plan; living with other people; healthy fear of risky behaviors or pain     Placement/Program/Barriers Identified: none    Referral: Day Treatment        Provider Name/ Credentials:  Val[facility] Nu[facility]ez, [facility]    August 5, 2022                            Outpatient Mental Health Services - Adult    MY COPING PLAN FOR SAFETY        Name: Rafaela Mckeon  YOB: 2000  Date: August 5, 2022   My primary care  provider: [facility]  My primary care clinic:   My prescriber:  Ladarius Cooney.  Next appointment: in 2 weeks   Other care team support:  psychotherapy with Brittney Manuel with [facility]    My Triggers:  My PTSD      Additional People, Places, and Things that I can access for support: my family, my provider         What is important to me and makes life worth living: being with my family and my friends.         GREEN    Good Control  1. I feel good  2. No suicidal thoughts   3. Can work, sleep and play      Action Steps  1. Self-care: balanced meals, exercising, sleep practices, etc.  2. Take your medications as prescribed.  3. Continue meetings with therapist and prescriber.  4.  Do the healthy things that I enjoy.             YELLOW  Getting Worse  I have ANY of these:  1. I do not feel good  2. Difficulty Concentrating  3. Sleep is changing  4. Increase/Change in my thoughts to hurt self and/or others, but I can still manage and not act on it.   5. Not taking care of self.               Action Steps (in addition to the above):  1. Inform your therapist and psychiatric prescriber/PCP.  2. Keep taking your medications as prescribed.    3. Turn to people you can ask for help.  4. Use internal coping strategies -see below.  5. Create safe environment: lock and limit medications, notify friends/family of increase in symptoms and reduce means to other identified method             RED  Get Help  If I have ANY of these:  1. Current and uncontrollable thoughts and/or behaviors to hurt self and/or others.      Actions to manage my safety  1. Contact your emergency person Monika Ho (mother) [phone]  2. Call my crisis team- [facility] [phone]  3. Or Call 211 or go to the emergency room right away          My Internal Coping Strategies include the following:  blow bubbles, belly breathing, arts and crafts, color, chew gum, fidget toys and use my coping skills    [Safety  Plan Provided via [employer]]     Safety Concerns  How To Identify Situations That Make Your Mental Health Worse:  Triggers are things that make your mental health worse.  Look at the list below to help you find your triggers and what you can do about them.     1. Identify Early Warning Signs:    Sometimes symptoms return, even when people do their best to stay well. Symptoms can develop over a short period of time with little or no warning, but most of the time they emerge gradually over several weeks.  Early warning signs are changes that people experience when a relapse is starting. Some early warning signs are common and others are not as common.   Common Early Warning Signs:    Feeling tense or nervous, Eating less or eating more, Trouble sleeping -either too much or too little sleep, Feeling depressed or low, Feeling irritable, Feeling like not being around other people, Trouble concentrating and Urges to harm self     2. Identify action steps to take when warning signs are noticed:    Taking Action- It is important to take action if you are experiencing early warning signs of a relapse.  The faster you act, the more likely it is that you can avoid a full relapse.  It is helpful to identify several specific ways to cope with symptoms.      The following is my list of symptoms and coping strategies that I can use when they are present:    Symptom Coping Strategies   Anxiety -Talk with someone in your support system and let him or her know how you are feeling.  -Use relaxation techniques such as deep breathing or imagery.  -Use positive affirmations to counteract negative self-talk such as  I am learning to let go of worry.    Depression - Schedule your day; include activities you have to do and activities you enjoy doing.  - Get some exercise - walk, run, bike, or swim.  - Give yourself credit for even the smallest things you get done.   Sleep Difficulties   - Go to sleep at the same time every day.  - Do  something relaxing before bed, such as drinking herbal tea or listening to music.  - Avoid having discussions about upsetting topics before going to bed.   Delusions   - Distract yourself from the disturbing thought by doing something that requires your attention such as a puzzle.  - Check out your beliefs by talking to someone you trust.    Hallucinations   - Use headphones to listen to music.  - Tell voices to  stop  or say to yourself,  I am safe.   - Ignore the hallucinations as much as possible; focus on other things.   Concentration Difficulties - Minimize distractions so there is only one thing for you to focus on at a time.    - Ask the person you are having a conversation with to slow down or repeat things you are unsure of.

## 2022-08-05 NOTE — PROGRESS NOTES
Outpatient Mental Health Services - Adult     MY COPING PLAN FOR SAFETY           Name: Rafaela Mckeon  YOB: 2000  Date: August 5, 2022   My [facility] care provider: [facility]  My primary care clinic:   My prescriber:  Ladarius Cooney.  Next appointment: in 2 weeks   Other care team support:  psychotherapy with Brittney Manuel with [facility]     My Triggers:  My PTSD        Additional People, Places, and Things that I can access for support: my family, my provider            What is important to me and makes life worth living: being with my family and my friends.            GREEN     Good Control  1. I feel good  2. No suicidal thoughts   3. Can work, sleep and play        Action Steps  1. Self-care: balanced meals, exercising, sleep practices, etc.  2. Take your medications as prescribed.  3. Continue meetings with therapist and prescriber.  4.  Do the healthy things that I enjoy.                YELLOW  Getting Worse  I have ANY of these:  1. I do not feel good  2. Difficulty Concentrating  3. Sleep is changing  4. Increase/Change in my thoughts to hurt self and/or others, but I can still manage and not act on it.   5. Not taking care of self.                Action Steps (in addition to the above):  1. Inform your therapist and psychiatric prescriber/PCP.  2. Keep taking your medications as prescribed.    3. Turn to people you can ask for help.  4. Use internal coping strategies -see below.  5. Create safe environment: lock and limit medications, notify friends/family of increase in symptoms and reduce means to other identified method                RED  Get Help  If I have ANY of these:  1. Current and uncontrollable thoughts and/or behaviors to hurt self and/or others.        Actions to manage my safety  1. Contact your emergency person Monika Ho (mother) [phone]  2. Call my crisis team- [facility] [phone]  3. Or Call 301 or go to the  emergency room right away            My Internal Coping Strategies include the following:  blow bubbles, belly breathing, arts and crafts, color, chew gum, fidget toys and use my coping skills     [Safety Plan Provided via [employer]]      Safety Concerns  How To Identify Situations That Make Your Mental Health Worse:  Triggers are things that make your mental health worse.  Look at the list below to help you find your triggers and what you can do about them.      1. Identify Early Warning Signs:     Sometimes symptoms return, even when people do their best to stay well. Symptoms can develop over a short period of time with little or no warning, but most of the time they emerge gradually over several weeks.  Early warning signs are changes that people experience when a relapse is starting. Some early warning signs are common and others are not as common.   Common Early Warning Signs:    Feeling tense or nervous, Eating less or eating more, Trouble sleeping -either too much or too little sleep, Feeling depressed or low, Feeling irritable, Feeling like not being around other people, Trouble concentrating and Urges to harm self                 2. Identify action steps to take when warning signs are noticed:     Taking Action- It is important to take action if you are experiencing early warning signs of a relapse.  The faster you act, the more likely it is that you can avoid a full relapse.  It is helpful to identify several specific ways to cope with symptoms.       The following is my list of symptoms and coping strategies that I can use when they are present:     Symptom Coping Strategies   Anxiety -Talk with someone in your support system and let him or her know how you are feeling.  -Use relaxation techniques such as deep breathing or imagery.  -Use positive affirmations to counteract negative self-talk such as  I am learning to let go of worry.    Depression - Schedule your day; include activities you have to do and  activities you enjoy doing.  - Get some exercise - walk, run, bike, or swim.  - Give yourself credit for even the smallest things you get done.   Sleep Difficulties    - Go to sleep at the same time every day.  - Do something relaxing before bed, such as drinking herbal tea or listening to music.  - Avoid having discussions about upsetting topics before going to bed.   Delusions    - Distract yourself from the disturbing thought by doing something that requires your attention such as a puzzle.  - Check out your beliefs by talking to someone you trust.    Hallucinations    - Use headphones to listen to music.  - Tell voices to  stop  or say to yourself,  I am safe.   - Ignore the hallucinations as much as possible; focus on other things.   Concentration Difficulties - Minimize distractions so there is only one thing for you to focus on at a time.    - Ask the person you are having a conversation with to slow down or repeat things you are unsure of.

## 2022-08-08 ENCOUNTER — HOSPITAL ENCOUNTER (OUTPATIENT)
Dept: BEHAVIORAL HEALTH | Facility: CLINIC | Age: 22
Discharge: HOME | End: 2022-08-08
Attending: PSYCHIATRY & NEUROLOGY
Payer: COMMERCIAL

## 2022-08-08 PROCEDURE — 90853 GROUP PSYCHOTHERAPY: CPT | Mod: GT,95

## 2022-08-08 PROCEDURE — 90837 PSYTX W PT 60 MINUTES: CPT | Mod: GT,95,59 | Performed by: PSYCHOLOGIST

## 2022-08-08 PROCEDURE — 90837 PSYTX W PT 60 MINUTES: CPT | Mod: GT | Performed by: PSYCHOLOGIST

## 2022-08-08 ASSESSMENT — ANXIETY QUESTIONNAIRES
7. FEELING AFRAID AS IF SOMETHING AWFUL MIGHT HAPPEN: SEVERAL DAYS
6. BECOMING EASILY ANNOYED OR IRRITABLE: SEVERAL DAYS
GAD7 TOTAL SCORE: 7
IF YOU CHECKED OFF ANY PROBLEMS ON THIS QUESTIONNAIRE, HOW DIFFICULT HAVE THESE PROBLEMS MADE IT FOR YOU TO DO YOUR WORK, TAKE CARE OF THINGS AT HOME, OR GET ALONG WITH OTHER PEOPLE: VERY DIFFICULT
1. FEELING NERVOUS, ANXIOUS, OR ON EDGE: MORE THAN HALF THE DAYS
3. WORRYING TOO MUCH ABOUT DIFFERENT THINGS: SEVERAL DAYS
GAD7 TOTAL SCORE: 7
2. NOT BEING ABLE TO STOP OR CONTROL WORRYING: SEVERAL DAYS
5. BEING SO RESTLESS THAT IT IS HARD TO SIT STILL: SEVERAL DAYS

## 2022-08-08 ASSESSMENT — PATIENT HEALTH QUESTIONNAIRE - PHQ9
5. POOR APPETITE OR OVEREATING: NOT AT ALL
SUM OF ALL RESPONSES TO PHQ QUESTIONS 1-9: 20
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
## 2022-08-08 NOTE — DISCHARGE SUMMARY
Adult Mental Health Intensive Outpatient Discharge Summary/Instructions      Patient: Rafaela Mckeon MRN: [id number]   : 2000 Age: 21    Admission Date: 22  Discharge Date: 10/31/22  Diagnosis: 295.70  (F25.1) Schizoaffective Disorder Depressive Type  309.81 (F43.10) Posttraumatic Stress Disorder (includes Posttraumatic Stress Disorder for Children 6 Years and Younger)  With dissociative symptoms.  300.02 (F41.1) Generalized Anxiety Disorder  300.3 (F42) Unspecified Obsessive Compulsive and Related Disorder  307.50 (F50.9) Unspecified Feeding or Eating Disorder.  History of Major Depression Disorder    Focus of Treatment / Progress    Personal Safety: Kit denies having any active concerns related to SI or SIB at discharge.     * Follow your safety plan     * Call crisis lines as needed:    [facility] [phone] [facility] [phone]  [facility] [phone] Crisis Connection [phone]  [facility] [phone] [facility] [phone]  [facility] [phone] National Suicide Prevention [phone]  [facility] [phone] Suicide Prevention [phone]  [facility] [phone]    Managing symptoms of:  Schizoaffective Disorder- depressed mood, psychosis, and anxiety    Community support/health:  [place] [address] ([phone]) [email or web address], [place] Crisis text Line (Text [place] to [phone]) or call [address]  [place], [place] ([phone])  [facility] [place], [place] ( [phone])  [facility] [address] [facility] [place], [place], [address] [phone]    Managing Symptoms and Preventing Relapse    * Go to all of your appointments    * Take all medications as directed.      * Carry a current list if medication with you    * Do not use illicit (street) drugs.  Avoid alcohol    * Report these symptoms to your care team. These are early signs of relapse:   Thoughts of  "suicide   Losing more sleep   Increased confusion   Mood getting worse   Feeling more aggressive   Other:  \"not taking care of self\" (not doing ADLs as usual)    *Use these skills daily:  Talk to someone you trust at least one time weekly, set boundaries and say \"no\", be assertive, act opposite of negative feelings, accept challenges with a positive attitude, exercise at least three times per week for 30 minutes,  get enough sleep, eat healthy foods, get into a good routine    Copy of summary sent to: In Epic My Chart    Follow up with psychiatrist / main caregiver: Ladarius Cooney at \"[facility]  Next visit: Please schedule if you do not have an appt already    Follow up with your therapist: Brittney Manuel with [facility]  Next visit: Please schedule if you do not have an appt already    Go to group therapy and / or support groups at: [facility] Connection and Depression Bipolar Support [place](DBSA) support groups    See your medical doctor about:  Fartun Arriaga at [facility] for general wellness or illness as needed.    Other:  Your treatment team appreciates having the opportunity to work with you and wishes you the best.    Client Signature:_______________________  Date / Time:___________  Staff Signature:________   Date / Time:__9/7/2022_____      Outpatient Mental Health Services - Adult     MY COPING PLAN FOR SAFETY           Name: Rafaela Mckeon  YOB: 2000  Date: August 5, 2022   My primary care provider: Fartun Arriaga  [facility]  My primary care clinic:   My prescriber:  Ladarius Cooney.  Next appointment: in 2 weeks   Other care team support:  psychotherapy with Brittney Manuel with [facility]         What is important to me and makes life worth living: being with my family and my friends.            GREEN     Good Control  1. I feel good  2. No suicidal thoughts   3. Can work, sleep and play        Action Steps  1. Self-care: balanced meals, exercising, sleep " practices, etc.  2. Take your medications as prescribed.  3. Continue meetings with therapist and prescriber.  4.  Do the healthy things that I enjoy.                YELLOW  Getting Worse  I have ANY of these:  1. I do not feel good  2. Difficulty Concentrating  3. Sleep is changing  4. Increase/Change in my thoughts to hurt self and/or others, but I can still manage and not act on it.   5. Not taking care of self.                Action Steps (in addition to the above):  1. Inform your therapist and psychiatric prescriber/PCP.  2. Keep taking your medications as prescribed.    3. Turn to people you can ask for help.  4. Use internal coping strategies -see below.  5. Create safe environment: lock and limit medications, notify friends/family of increase in symptoms and reduce means to other identified method                RED  Get Help  If I have ANY of these:  1. Current and uncontrollable thoughts and/or behaviors to hurt self and/or others.        Actions to manage my safety  1. Contact your emergency person Monika Ho (mother) [phone]  2. Call my crisis team- [facility] [phone]  3. Or Call 911 or go to the emergency room right away            My Internal Coping Strategies include the following:  blow bubbles, belly breathing, arts and crafts, color, chew gum, fidget toys and use my coping skills     [Safety Plan Provided via [employer]]      Safety Concerns  How To Identify Situations That Make Your Mental Health Worse:  Triggers are things that make your mental health worse.  Look at the list below to help you find your triggers and what you can do about them.      1. Identify Early Warning Signs:     Sometimes symptoms return, even when people do their best to stay well. Symptoms can develop over a short period of time with little or no warning, but most of the time they emerge gradually over several weeks.  Early warning signs are changes that people experience when a relapse is starting.  Some early warning signs are common and others are not as common.   Common Early Warning Signs:    Feeling tense or nervous, Eating less or eating more, Trouble sleeping -either too much or too little sleep, Feeling depressed or low, Feeling irritable, Feeling like not being around other people, Trouble concentrating and Urges to harm self                 2. Identify action steps to take when warning signs are noticed:     Taking Action- It is important to take action if you are experiencing early warning signs of a relapse.  The faster you act, the more likely it is that you can avoid a full relapse.  It is helpful to identify several specific ways to cope with symptoms.       The following is my list of symptoms and coping strategies that I can use when they are present:     Symptom Coping Strategies   Anxiety -Talk with someone in your support system and let him or her know how you are feeling.  -Use relaxation techniques such as deep breathing or imagery.  -Use positive affirmations to counteract negative self-talk such as  I am learning to let go of worry.    Depression - Schedule your day; include activities you have to do and activities you enjoy doing.  - Get some exercise - walk, run, bike, or swim.  - Give yourself credit for even the smallest things you get done.   Sleep Difficulties    - Go to sleep at the same time every day.  - Do something relaxing before bed, such as drinking herbal tea or listening to music.  - Avoid having discussions about upsetting topics before going to bed.   Delusions    - Distract yourself from the disturbing thought by doing something that requires your attention such as a puzzle.  - Check out your beliefs by talking to someone you trust.    Hallucinations    - Use headphones to listen to music.  - Tell voices to  stop  or say to yourself,  I am safe.   - Ignore the hallucinations as much as possible; focus on other things.   Concentration Difficulties - Minimize  distractions so there is only one thing for you to focus on at a time.    - Ask the person you are having a conversation with to slow down or repeat things you are unsure of.

## 2022-08-08 NOTE — PROGRESS NOTES
Adult Outpatient Programs  Individualized Treatment Plan       Date of Plan: 8/15/22    Name: Rafaela Mckeon MRN: [id number]    : 2000     Program: Adult Day Treatment Program (ADT)    Clinical Track: 1A    DSM5 Diagnosis:  295.70  (F25.1) Schizoaffective Disorder Depressive Type  309.81 (F43.10) Posttraumatic Stress Disorder (includes Posttraumatic Stress Disorder for Children 6 Years and Younger)  With dissociative symptoms.  300.02 (F41.1) Generalized Anxiety Disorder  300.3 (F42) Unspecified Obsessive Compulsive and Related Disorder  307.50 (F50.9) Unspecified Feeding or Eating Disorder.  History of Major Depression Disorder    ADT Multidisciplinary Team Members:  Dr. Gabe Velez MD, Subha Murphy, [facility], Kwame Daniels, OTR/L,Deanna Avila, RN, BSN, [facility], [facility]  Rafaela Mckeon will participate in the Adult Outpatient Programs Clinic Group; 3 day per week, 3 hours per day.   Anticipated duration/discharge: 12 weeks    Due to COVID-19, services will be delivered via telemedicine until further notice.     Program Start Date: 22  Anticipated Discharge Date: 10/31/22 (pending authorization/clinical changes)    Review Date: Does Rafaela Mckeon continue to meet criteria to participate in the ADT Program, 3 days per week; 3 hours per day?   8/15/22 Yes- David Daniels OTR/L on 8/15/2022 at 1:13 PM   22 Yes Yamilet Alexander, D,  Licensed Clinical Psychologist   1145 am   2022 staffing Yes - Gabe Velez MD on 2022 at 8:23 AM   Discharged 2022                  Client Strengths:  has a previous history of therapy, open to learning and support of family, friends and providers     Client Participation in Plan:  Contributed to goals and plan     Areas of Vulnerability:  Suicidal Ideation   Psychotic symptoms/behavior   Anxiety  Depressive symptoms   Trauma/Abuse/Neglect    Long-Term Goals:  Knowledge about illness and management of symptoms  "  Maintenance of personal safety     Abuse Prevention Plan:  Safe, therapeutic environment   Safety coping plan as needed   Education regarding illness and skill development   Coordination with care providers     Discharge Criteria:  Satisfactory progress toward treatment goals   Improvement re: identified problems and symptoms   Ability to continue recovery at next level of service   Has a discharge plan in place   Has safety/coping plan in place      Areas of Treatment Focus     Why are you seeking treatment/What do you want to focus on during treatment? \"Major depressive and illness management\".  The problem(s) began 6/17/2012.          Area of Treatment Focus:   Personal Safety  Start Date:    8/15/22    Goal:  Target Date: 10/10/22 Status: Stopped  Kit will notify staff when needing assistance to develop or implement a coping plan to manage suicidal or self-harm thoughts.Use coping plan for safety, as needed.      Progress:   9/7/2022- Pt asked to discharge early to start a new job in a  that she is excited about        Treatment Strategies:   Assess / reassess level of potential for harm to self or others  Engage in safety planning when indicated  Facilitate increased self awareness          Area of Treatment Focus:   Symptom Stabilization and Management  Start Date:    8/15/22    Goal:  Target Date: 10/10/22 Status: Stopped  Kit will learn and practice 1-2 coping skills to help improve \"major depressive and illness management\".       Progress:   9/7/2022- Pt asked to discharge early to start a new job in a  that she is excited about        Treatment Strategies:   Assess / reassess level of potential for harm to self or others  Engage in safety planning when indicated  Facilitate increased self awareness  Teach adaptive coping skills and communication skills          Area of Treatment Focus:   Wellness and Mental Health Recovery  Start Date:    8/15/22    Goal:  Target Date: 10/10/22 Status: " Stopped  Kit will improve wellness related behaviors by getting adequate sleep,exercise,balanced nutrition and stress relief to maintain good mental health. Also as a part of recovery be thinking about your social support,living situation and purpose.      Progress:  9/7/2022- Pt asked to discharge early to start a new job in a  that she is excited about.            Treatment Strategies:   Assess / reassess level of potential for harm to self or others  Engage in safety planning when indicated  Facilitate increased self awareness  Teach adaptive coping skills and communication skills       David Daniels, OTR/CAREY [place], [facility] 9/7/2022    NOTE: Signatures are available on the Acknowledgement of Treatment Plan located in Chart Review    The Individualized Treatment Plan Signature Page has been routed to the provider for co-sign.    I have reviewed the patient's Individualized Treatment Plan and agree with the current goals, interventions and level of care.     Yamilet Alexander, NOLBERTO,  Licensed Clinical Psychologist      8/8/2022     I have also reviewed the patient's Individualized Treatment Plan and agree with the current goals, interventions and level of care.     Gabe Velez MD  8/16/2022, 8/22/2022

## 2022-08-08 NOTE — GROUP NOTE
Psychoeducation Group Note    PATIENT'S NAME: Rafaela Mckeon  MRN:   [id number]  :   2000  ACCT. NUMBER: [id number]  DATE OF SERVICE: 22  START TIME: 11:00 AM  END TIME: 11:50 AM  FACILITATOR: Subha Murphy  TOPIC: MH Life Skills Group: Resiliency Development  [facility] Adult Mental Health Day Treatment  TRACK: 1a    NUMBER OF PARTICIPANTS: 5                                      Service Modality:  Video Visit     Telemedicine Visit: The patient's condition can be safely assessed and treated via synchronous audio and visual telemedicine encounter.      Reason for Telemedicine Visit: Services only offered telehealth    Originating Site (Patient Location): Patient's home    Distant Site (Provider Location): Provider Remote Setting- Home Office    Consent:  The patient/guardian has verbally consented to: the potential risks and benefits of telemedicine (video visit) versus in person care; bill my insurance or make self-payment for services provided; and responsibility for payment of non-covered services.     Patient would like the video invitation sent by:  My Chart    Mode of Communication:  Video Conference via Medical Zoom    As the provider I attest to compliance with applicable laws and regulations related to telemedicine.          Summary of Group / Topics Discussed:  Resiliency Development:  Coping Skills: Patients were taught how to identify stressors, signs of stress, coping skills, and prevention strategies for overall stress management.  Patients were given the opportunity to identify both ongoing and acute mental health symptoms and how to effectively manage these symptoms by developing an effective aftercare plan.  Patients increased awareness of community based resources.    Patient Session Goals / Objectives:  Identified how using coping skills can be used for symptom and stress management     Improved awareness of individualed symptoms and stressors and how to effectively cope    Established a relapse prevention plan to practice these skills in their own environments  Practiced and reflected on how to generalize taught skills to their everyday life        Patient Participation / Response:  {[facility] PROGRESS NOTE PATIENT PARTICIPATION:[id number]}    {[facility] PROGRESS NOTE PATIENT RESPONSE - LIFE SKILLS :070379}    Treatment Plan:  Patient has {[facility] PROGRAMMATIC [place] PLAN STATUS:[id number]}    Subha Murphy

## 2022-08-08 NOTE — GROUP NOTE
Process Group Note    PATIENT'S NAME: Rafaela Mckeon  MRN:   [id number]  :   2000  ACCT. NUMBER: [id number]  DATE OF SERVICE: 22  START TIME:  9:00 AM  END TIME:  9:50 AM  FACILITATOR: Subha Murphy  TOPIC:  Process Group    Diagnoses:  295.70  (F25.1) Schizoaffective Disorder Depressive Type  309.81 (F43.10) Posttraumatic Stress Disorder (includes Posttraumatic Stress Disorder for Children 6 Years and Younger)  With dissociative symptoms.  4. Other Diagnoses that is relevant to services:   296.32 (F33.1) Major Depressive Disorder, Recurrent Episode, Moderate _  300.02 (F41.1) Generalized Anxiety Disorder  300.3 (F42) Unspecified Obsessive Compulsive and Related Disorder  307.50 (F50.9) Unspecified Feeding or Eating Disorder.      [facility] Mental [facility] Day Treatment  TRACK: 1A    NUMBER OF PARTICIPANTS: 5                                      Service Modality:  Video Visit     Telemedicine Visit: The patient's condition can be safely assessed and treated via synchronous audio and visual telemedicine encounter.      Reason for Telemedicine Visit: Services only offered telehealth    Originating Site (Patient Location): Patient's home    Distant Site (Provider Location): Provider Remote Setting- Home Office    Consent:  The patient/guardian has verbally consented to: the potential risks and benefits of telemedicine (video visit) versus in person care; bill my insurance or make self-payment for services provided; and responsibility for payment of non-covered services.     Patient would like the video invitation sent by:  My Chart    Mode of Communication:  Video Conference via Medical Zoom    As the provider I attest to compliance with applicable laws and regulations related to telemedicine.                Data:    Session content: At the start of this group, patients were invited to check in by identifying themselves, describing their current emotional status, and identifying issues to  address in this group.   Area(s) of treatment focus addressed in this session included Symptom Management, Personal Safety, Develop / Improve Independent Living Skills and Develop Socialization / Interpersonal Relationship Skills.  Kit introduced self with she/her pronouns.  She reported feeling very anxious today about starting group and noted significant social anxiety.  She also reports depression is higher right now.  She reported awareness that depression ebbs and flows and she just has to get through it.  She has history of safety concerns but denies any suicidal ideation and self harm urges today.  She reported no chemical health issues or medication concerns.  She reported goal to do her laundry and noted barrier of low motivation.  Skill of putting a movie on to do laundry simultaneous to more positive event has been helpful in the past. She is grateful for sister and sister's fiance as supports.  Especially after having negative self judgments after attending grad party this weekend and feeling reminded about how she is not where she thought she would be in her life right now.    Therapeutic Interventions/Treatment Strategies:  Psychotherapist offered support, feedback and validation and reinforced use of skills. Treatment modalities used include Motivational Interviewing, Cognitive Behavioral Therapy and Dialectical Behavioral Therapy. Validated and normalized.  Highlighted and reinforced skills used; connected to positive outcomes.  Provided additional skill suggestions.  Aided in creating a plan to help work towards goals.        Assessment:    Patient response:   Patient responded to session by accepting feedback, giving feedback, listening, focusing on goals, being attentive and accepting support    Possible barriers to participation / learning include: and no barriers identified    Health Issues:   None reported       Substance Use Review:   Substance Use: No active concerns identified.    Mental  Status/Behavioral Observations  Appearance:   Appropriate   Eye Contact:   Good   Psychomotor Behavior: Normal   Attitude:   Cooperative   Orientation:   All  Speech   Rate / Production: Normal    Volume:  Normal   Mood:    Anxious  Depressed   Affect:    Appropriate   Thought Content:   Rumination  Thought Form:  Coherent  Logical     Insight:    Good     Plan:     Safety Plan: No current safety concerns identified.  Recommended that patient call 911 or go to the local ED should there be a change in any of these risk factors.     Barriers to treatment: None identified    Patient Contracts (see media tab):  None    Substance Use: Not addressed in session     Continue or Discharge: Patient will continue in Adult Day Treatment (ADT)  as planned. Patient is likely to benefit from learning and using skills as they work toward the goals identified in their treatment plan.      Subha Murphy  August 8, 2022

## 2022-08-08 NOTE — GROUP NOTE
Psychoeducation Group Note    PATIENT'S NAME: Rafaela Mckeon  MRN:   [id number]  :   2000  ACCT. NUMBER: [id number]  DATE OF SERVICE: 22  START TIME: 10:00 AM  END TIME: 10:50 AM  FACILITATOR: Subha Murphy  TOPIC:  Wellness Group: Brain Health  [facility] Adult Mental Health Day Treatment  TRACK: 1A    NUMBER OF PARTICIPANTS: 5                                      Service Modality:  Video Visit     Telemedicine Visit: The patient's condition can be safely assessed and treated via synchronous audio and visual telemedicine encounter.      Reason for Telemedicine Visit: Services only offered telehealth    Originating Site (Patient Location): Patient's home    Distant Site (Provider Location): Provider Remote Setting- Home Office    Consent:  The patient/guardian has verbally consented to: the potential risks and benefits of telemedicine (video visit) versus in person care; bill my insurance or make self-payment for services provided; and responsibility for payment of non-covered services.     Patient would like the video invitation sent by:  My Chart    Mode of Communication:  Video Conference via Medical Zoom    As the provider I attest to compliance with applicable laws and regulations related to telemedicine.          Summary of Group / Topics Discussed:  Brain Health:  Pathophysiology of Mood Disorders: Patients were educated on mood disorder etiology and neuroscience, risk factors, symptoms, and pharmacologic, psychotherapeutic, and complementary treatment options. Patients were guided on a discussion of mental, behavioral, and physical symptoms and shared their symptoms with the group.     Patient Session Goals / Objectives:  ? Described what mood disorders are and identified risk factors   ? Explained how chemical imbalances in the brain can cause symptoms and how medications work to reverse this imbalance   ? Identified and described pharmacologic, psychotherapeutic, and complementary  treatment options      Patient Participation / Response:  Fully participated with the group by sharing personal reflections / insights and openly received / provided feedback with other participants.    Demonstrated understanding of topics discussed through group discussion and participation, Identified / Expressed personal readiness to practice skills and Verbalized understanding of brain health topic    Treatment Plan:  Patient has an initial individualized treatment plan that was created as part of their diagnostic assessment / admission process.  A master individualized treatment plan is in the process of being developed with the patient and multi-disciplinary care team.    Subha Murphy

## 2022-08-10 ENCOUNTER — HOSPITAL ENCOUNTER (OUTPATIENT)
Dept: BEHAVIORAL HEALTH | Facility: CLINIC | Age: 22
Discharge: HOME | End: 2022-08-10
Attending: PSYCHIATRY & NEUROLOGY
Payer: COMMERCIAL

## 2022-08-10 PROCEDURE — 90853 GROUP PSYCHOTHERAPY: CPT | Mod: GT,95

## 2022-08-10 NOTE — GROUP NOTE
Psychoeducation Group Note    PATIENT'S NAME: Rafaela Mckeon  MRN:   [id number]  :   2000  ACCT. NUMBER: [id number]  DATE OF SERVICE: 8/10/22  START TIME: 10:00 AM  END TIME: 10:50 AM  FACILITATOR: David Daniels OTR/L; Thad Donnelly  TOPIC: MH Life Skills Group: Resiliency Development  [facility] Adult Mental Health Day Treatment  TRACK: 1A                                      Service Modality:  Video Visit     Telemedicine Visit: The patient's condition can be safely assessed and treated via synchronous audio and visual telemedicine encounter.      Reason for Telemedicine Visit: Services only offered telehealth    Originating Site (Patient Location): Patient's home    Distant Site (Provider Location): Provider Remote Setting- Home Office    Consent:  The patient/guardian has verbally consented to: the potential risks and benefits of telemedicine (video visit) versus in person care; bill my insurance or make self-payment for services provided; and responsibility for payment of non-covered services.     Patient would like the video invitation sent by:  My Chart    Mode of Communication:  Video Conference via Medical Zoom    As the provider I attest to compliance with applicable laws and regulations related to telemedicine.          NUMBER OF PARTICIPANTS: 5    Summary of Group / Topics Discussed:  Resiliency Development:  Coping Skills: Patients were taught how to identify stressors, signs of stress, coping skills, and prevention strategies for overall stress management.  Patients were given the opportunity to identify both ongoing and acute mental health symptoms and how to effectively manage these symptoms by developing an effective aftercare plan.  Patients increased awareness of community based resources.    Patient Session Goals / Objectives:    Identified how using coping skills can be used for symptom and stress management       Improved awareness of individualed symptoms and stressors  and how to effectively cope     Established a relapse prevention plan to practice these skills in their own environments    Practiced and reflected on how to generalize taught skills to their everyday life        Patient Participation / Response:  Moderately participated, sharing some personal reflections / insights and adequately adequately received / provided feedback with other participants.    Patient presentation: Weekly mental health check in. and Verbalized understanding of content    Treatment Plan:  Patient has an initial individualized treatment plan that was created as part of their diagnostic assessment / admission process.  A master individualized treatment plan is in the process of being developed with the patient and multi-disciplinary care team.    Thad Donnelly, LPCC, LADC

## 2022-08-10 NOTE — GROUP NOTE
Psychoeducation Group Note    PATIENT'S NAME: Rafaela Mckeon  MRN:   [id number]  :   2000  ACCT. NUMBER: [id number]  DATE OF SERVICE: 8/10/22  START TIME: 11:00 AM  END TIME: 11:50 AM  FACILITATOR: Deanna Avila RN; Thad Donnelly  TOPIC:  Wellness Group: Brain Health  [facility] Day Treatment  TRACK: 1A                                      Service Modality:  Video Visit     Telemedicine Visit: The patient's condition can be safely assessed and treated via synchronous audio and visual telemedicine encounter.      Reason for Telemedicine Visit: Services only offered telehealth    Originating Site (Patient Location): Patient's home    Distant Site (Provider Location): Provider Remote Setting- Home Office    Consent:  The patient/guardian has verbally consented to: the potential risks and benefits of telemedicine (video visit) versus in person care; bill my insurance or make self-payment for services provided; and responsibility for payment of non-covered services.     Patient would like the video invitation sent by:  My Chart    Mode of Communication:  Video Conference via Medical Zoom    As the provider I attest to compliance with applicable laws and regulations related to telemedicine.          NUMBER OF PARTICIPANTS: 6    Summary of Group / Topics Discussed:  Brain Health:  Pathophysiology of stress and anxiety: Patients were educated on the difference between stress, chronic stress, and anxiety. The anatomy and pathophysiology of the body/brain were reviewed to illustrate the immediate effects of stress/anxiety in the body and the long term effects and increased risks for chronic disease that come from unmanaged stress/anxiety. Self-coping strategies to manage symptoms of stress were reviewed and pharmacologic, psychotherapeutic, and complementary treatment options were discussed.    Patient Session Goals / Objectives:  ? Described the differences between stress and  anxiety and how the body responds to it  ? Listed the long term effects and increased risks for chronic disease that can arise from unmanaged stress/anxiety  ? Identified and described pharmacologic, psychotherapeutic, and complementary treatment options      Patient Participation / Response:  Moderately participated, sharing some personal reflections / insights and adequately adequately received / provided feedback with other participants.    Demonstrated understanding of topics discussed through group discussion and participation    Treatment Plan:  Patient has an initial individualized treatment plan that was created as part of their diagnostic assessment / admission process.  A master individualized treatment plan is in the process of being developed with the patient and multi-disciplinary care team.    Thad Donnelly, LPCC, LADC

## 2022-08-10 NOTE — GROUP NOTE
Process Group Note    PATIENT'S NAME: Rafaela Mckeon  MRN:   [id number]  :   2000  ACCT. NUMBER: [id number]  DATE OF SERVICE: 8/10/22  START TIME:  9:00 AM  END TIME:  9:50 AM  FACILITATOR: Subha Murphy  TOPIC:  Process Group    Diagnoses:  295.70  (F25.1) Schizoaffective Disorder Depressive Type  309.81 (F43.10) Posttraumatic Stress Disorder (includes Posttraumatic Stress Disorder for Children 6 Years and Younger)  With dissociative symptoms.  4. Other Diagnoses that is relevant to services:   296.32 (F33.1) Major Depressive Disorder, Recurrent Episode, Moderate _  300.02 (F41.1) Generalized Anxiety Disorder  300.3 (F42) Unspecified Obsessive Compulsive and Related Disorder  307.50 (F50.9) Unspecified Feeding or Eating Disorder.      [facility] Mental [facility] Day Treatment  TRACK: 1A    NUMBER OF PARTICIPANTS: 8                                      Service Modality:  Video Visit     Telemedicine Visit: The patient's condition can be safely assessed and treated via synchronous audio and visual telemedicine encounter.      Reason for Telemedicine Visit: Services only offered telehealth    Originating Site (Patient Location): Patient's home    Distant Site (Provider Location): Provider Remote Setting- Home Office    Consent:  The patient/guardian has verbally consented to: the potential risks and benefits of telemedicine (video visit) versus in person care; bill my insurance or make self-payment for services provided; and responsibility for payment of non-covered services.     Patient would like the video invitation sent by:  My Chart    Mode of Communication:  Video Conference via Medical Zoom    As the provider I attest to compliance with applicable laws and regulations related to telemedicine.               Data:    Session content: At the start of this group, patients were invited to check in by identifying themselves, describing their current emotional status, and identifying issues to  address in this group.   Area(s) of treatment focus addressed in this session included Symptom Management, Personal Safety, Develop / Improve Independent Living Skills and Develop Socialization / Interpersonal Relationship Skills.  Jewel reported mood is okay and anxious as her sister's bridal shower approaches.  She has been planning it for a while so is excited but nervous for it to go well.  She reported plans to go to a cabin next week and will be absent Wednesday and Friday.  Last year she attempted suicide while on this trip.  She feels she has a better coping plan going into this trip and knows that mom is going to be checking in with her too.  She denies any safety concerns at this time but felt she went into last trip without safety concerns, too.  She reported goal to do laundry, noted barrier of low motivation.  She reported feeling grateful for good weather.    Therapeutic Interventions/Treatment Strategies:  Psychotherapist offered support, feedback and validation and reinforced use of skills. Treatment modalities used include Motivational Interviewing, Cognitive Behavioral Therapy and Dialectical Behavioral Therapy. Validated and normalized.  Highlighted and reinforced skills used; connected to positive outcomes.  Provided additional skill suggestions.  Aided in creating a plan to help work towards goals.        Assessment:    Patient response:   Patient responded to session by accepting feedback, giving feedback, listening, focusing on goals, being attentive and accepting support    Possible barriers to participation / learning include: and no barriers identified    Health Issues:   None reported       Substance Use Review:   Substance Use: No active concerns identified.    Mental Status/Behavioral Observations  Appearance:   Appropriate   Eye Contact:   Good   Psychomotor Behavior: Normal   Attitude:   Cooperative   Orientation:   All  Speech   Rate / Production: Normal    Volume:  Normal    Mood:    Anxious  Normal  Affect:    Appropriate   Thought Content:   Rumination  Thought Form:  Coherent  Logical     Insight:    Good     Plan:     Safety Plan: No current safety concerns identified.  Recommended that patient call 911 or go to the local ED should there be a change in any of these risk factors.     Barriers to treatment: None identified    Patient Contracts (see media tab):  None    Substance Use: Not addressed in session     Continue or Discharge: Patient will continue in Adult Day Treatment (ADT)  as planned. Patient is likely to benefit from learning and using skills as they work toward the goals identified in their treatment plan.      Subha Murphy  August 10, 2022

## 2022-08-12 ENCOUNTER — HOSPITAL ENCOUNTER (OUTPATIENT)
Dept: BEHAVIORAL HEALTH | Facility: CLINIC | Age: 22
Discharge: HOME | End: 2022-08-12
Attending: PSYCHIATRY & NEUROLOGY
Payer: COMMERCIAL

## 2022-08-12 PROCEDURE — 90853 GROUP PSYCHOTHERAPY: CPT | Mod: GT,95

## 2022-08-12 NOTE — GROUP NOTE
Psychotherapy Group Note    PATIENT'S NAME: Rafaela Mckeon  MRN:   [id number]  :   2000  ACCT. NUMBER: [id number]  DATE OF SERVICE: 22  START TIME: 10:00 AM  END TIME: 10:50 AM  FACILITATOR: Subha Murphy  TOPIC: MH EBP Group: Emotions Management  [facility] Mental Health Day Treatment  TRACK: 1A    NUMBER OF PARTICIPANTS: 6                                      Service Modality:  Video Visit     Telemedicine Visit: The patient's condition can be safely assessed and treated via synchronous audio and visual telemedicine encounter.      Reason for Telemedicine Visit: Services only offered telehealth    Originating Site (Patient Location): Patient's home    Distant Site (Provider Location): Provider Remote Setting- Home Office    Consent:  The patient/guardian has verbally consented to: the potential risks and benefits of telemedicine (video visit) versus in person care; bill my insurance or make self-payment for services provided; and responsibility for payment of non-covered services.     Patient would like the video invitation sent by:  My Chart    Mode of Communication:  Video Conference via Medical Zoom    As the provider I attest to compliance with applicable laws and regulations related to telemedicine.        Summary of Group / Topics Discussed:  Self-Awareness: Self-Compassion: Patients received overview of key concepts in developing self-compassion. Patients discussed mindfulness, self-kindness, and finding common humanity. Patients identified their current approach to problems in their lives and learned skills for increasing self-compassion. Patients identified ways they can put self-compassion skills into practice and problem solve barriers to application of skills.     Patient Session Goals / Objectives:    Stirling components of self-compassion    Identify ways to practice self-compassion in daily life    Problem solve barriers to self-compassion practice      Patient  Participation / Response:  Fully participated with the group by sharing personal reflections / insights and openly received / provided feedback with other participants.    Demonstrated understanding of topics discussed through group discussion and participation, Expressed understanding of the relevance / importance of emotions management skills at distressing times in life and Self-aware of experiences with difficult emotions, and strategies to employ to manage them    Treatment Plan:  Patient has an initial individualized treatment plan that was created as part of their diagnostic assessment / admission process.  A master individualized treatment plan is in the process of being developed with the patient and multi-disciplinary care team.    Subha Murphy

## 2022-08-12 NOTE — GROUP NOTE
Process Group Note    PATIENT'S NAME: Rafaela Mckeon  MRN:   [id number]  :   2000  ACCT. NUMBER: [id number]  DATE OF SERVICE: 22  START TIME:  9:00 AM  END TIME:  9:50 AM  FACILITATOR: Subha Murphy  TOPIC:  Process Group    Diagnoses:  295.70  (F25.1) Schizoaffective Disorder Depressive Type  309.81 (F43.10) Posttraumatic Stress Disorder (includes Posttraumatic Stress Disorder for Children 6 Years and Younger)  With dissociative symptoms.  4. Other Diagnoses that is relevant to services:   296.32 (F33.1) Major Depressive Disorder, Recurrent Episode, Moderate _  300.02 (F41.1) Generalized Anxiety Disorder  300.3 (F42) Unspecified Obsessive Compulsive and Related Disorder  307.50 (F50.9) Unspecified Feeding or Eating Disorder.      [facility] Mental [facility] Day Treatment  TRACK: 1A    NUMBER OF PARTICIPANTS: 6                                      Service Modality:  Video Visit     Telemedicine Visit: The patient's condition can be safely assessed and treated via synchronous audio and visual telemedicine encounter.      Reason for Telemedicine Visit: Services only offered telehealth    Originating Site (Patient Location): Patient's home    Distant Site (Provider Location): Provider Remote Setting- Home Office    Consent:  The patient/guardian has verbally consented to: the potential risks and benefits of telemedicine (video visit) versus in person care; bill my insurance or make self-payment for services provided; and responsibility for payment of non-covered services.     Patient would like the video invitation sent by:  My Chart    Mode of Communication:  Video Conference via Medical Zoom    As the provider I attest to compliance with applicable laws and regulations related to telemedicine.                Data:    Session content: At the start of this group, patients were invited to check in by identifying themselves, describing their current emotional status, and identifying issues to  address in this group.   Area(s) of treatment focus addressed in this session included Symptom Management, Personal Safety, Develop / Improve Independent Living Skills and Develop Socialization / Interpersonal Relationship Skills.  Kit reported anxiety is very high related to upcoming bridal shower tomorrow.  She agrees she is engaging in ruminations and catastrophizing.  She also participated in challenging these thoughts effectively.  Created plan for how she might cope with anxiety before, during, and after bridal shower.  Will prioritize rest and self care Sunday.  Is also anxious about grandma coming into town who she has not seen since suicide attempt; is worried this topic will come up but agrees she feels able to set boundaries with grandma and focus on bridal shower.  She reported passive SI with no plan or intent; is able to commit to safety.  Did not endorse substance use or medication concerns.  Reports poor sleep and nightmares.     Therapeutic Interventions/Treatment Strategies:  Psychotherapist offered support, feedback and validation and reinforced use of skills. Treatment modalities used include Motivational Interviewing, Cognitive Behavioral Therapy and Dialectical Behavioral Therapy. Validated and normalized.  Highlighted and reinforced skills used; connected to positive outcomes.  Provided additional skill suggestions.  Aided in creating a plan to help work towards goals.        Assessment:    Patient response:   Patient responded to session by accepting feedback, giving feedback, listening, focusing on goals, being attentive and accepting support    Possible barriers to participation / learning include: and no barriers identified    Health Issues:   Yes: Sleep disturbance, Associated Psychological Distress       Substance Use Review:   Substance Use: No active concerns identified.    Mental Status/Behavioral Observations  Appearance:   Appropriate   Eye Contact:   Good   Psychomotor  Behavior: Normal   Attitude:   Cooperative   Orientation:   All  Speech   Rate / Production: Normal    Volume:  Soft   Mood:    Anxious  Depressed   Affect:    Appropriate   Thought Content:   Rumination and Safety reports  presence of suicidal ideation passive suicidal ideation   Thought Form:  Coherent  Logical     Insight:    Good     Plan:     Safety Plan: Committed to safety and agreed to follow previously developed safety coping plan.      Barriers to treatment: None identified    Patient Contracts (see media tab):  None    Substance Use: Not addressed in session     Continue or Discharge: Patient will continue in Adult Day Treatment (ADT)  as planned. Patient is likely to benefit from learning and using skills as they work toward the goals identified in their treatment plan.      Subha Murphy  August 12, 2022

## 2022-08-15 ENCOUNTER — HOSPITAL ENCOUNTER (OUTPATIENT)
Dept: BEHAVIORAL HEALTH | Facility: CLINIC | Age: 22
Discharge: HOME | End: 2022-08-15
Attending: PSYCHIATRY & NEUROLOGY
Payer: COMMERCIAL

## 2022-08-15 PROCEDURE — 90853 GROUP PSYCHOTHERAPY: CPT | Mod: GT,95

## 2022-08-15 NOTE — GROUP NOTE
Psychoeducation Group Note    PATIENT'S NAME: Rafaela Mckeon  MRN:   [id number]  :   2000  ACCT. NUMBER: [id number]  DATE OF SERVICE: 8/15/22  START TIME: 10:00 AM  END TIME: 10:50 AM  FACILITATOR: Subha Murphy  TOPIC:  Wellness Group: Brain Health  [facility] Mental Health Day Treatment  TRACK: 1A    NUMBER OF PARTICIPANTS: 6                                      Service Modality:  Video Visit     Telemedicine Visit: The patient's condition can be safely assessed and treated via synchronous audio and visual telemedicine encounter.      Reason for Telemedicine Visit: Services only offered telehealth    Originating Site (Patient Location): Patient's home    Distant Site (Provider Location): Provider Remote Setting- Home Office    Consent:  The patient/guardian has verbally consented to: the potential risks and benefits of telemedicine (video visit) versus in person care; bill my insurance or make self-payment for services provided; and responsibility for payment of non-covered services.     Patient would like the video invitation sent by:  My Chart    Mode of Communication:  Video Conference via Medical Zoom    As the provider I attest to compliance with applicable laws and regulations related to telemedicine.          Summary of Group / Topics Discussed:  Brain Health:  Pathophysiology of stress and anxiety: Patients were educated on the difference between stress, chronic stress, and anxiety. The anatomy and pathophysiology of the body/brain were reviewed to illustrate the immediate effects of stress/anxiety in the body and the long term effects and increased risks for chronic disease that come from unmanaged stress/anxiety. Self-coping strategies to manage symptoms of stress were reviewed and pharmacologic, psychotherapeutic, and complementary treatment options were discussed.    Patient Session Goals / Objectives:  ? Described the differences between stress and anxiety and how the body  responds to it  ? Listed the long term effects and increased risks for chronic disease that can arise from unmanaged stress/anxiety  ? Identified and described pharmacologic, psychotherapeutic, and complementary treatment options      Patient Participation / Response:  Moderately participated, sharing some personal reflections / insights and adequately adequately received / provided feedback with other participants.    Demonstrated understanding of topics discussed through group discussion and participation and Identified / Expressed personal readiness to practice skills    Treatment Plan:  Patient has a current master individualized treatment plan.  See Epic treatment plan for more information.    Subha Murphy

## 2022-08-15 NOTE — ADDENDUM NOTE
Encounter addended by: David Daniels, OTR/L on: 8/15/2022 1:17 PM   Actions taken: Clinical Note Signed

## 2022-08-15 NOTE — GROUP NOTE
Process Group Note    PATIENT'S NAME: Rafaela Mckeon  MRN:   [id number]  :   2000  ACCT. NUMBER: [id number]  DATE OF SERVICE: 8/15/22  START TIME:  9:00 AM  END TIME:  9:50 AM  FACILITATOR: Subha Murphy  TOPIC:  Process Group    Diagnoses:  295.70  (F25.1) Schizoaffective Disorder Depressive Type  309.81 (F43.10) Posttraumatic Stress Disorder (includes Posttraumatic Stress Disorder for Children 6 Years and Younger)  With dissociative symptoms.  4. Other Diagnoses that is relevant to services:   296.32 (F33.1) Major Depressive Disorder, Recurrent Episode, Moderate _  300.02 (F41.1) Generalized Anxiety Disorder  300.3 (F42) Unspecified Obsessive Compulsive and Related Disorder  307.50 (F50.9) Unspecified Feeding or Eating Disorder.      [facility] Mental [facility] Day Treatment  TRACK: 1A    NUMBER OF PARTICIPANTS: 6                                      Service Modality:  Video Visit     Telemedicine Visit: The patient's condition can be safely assessed and treated via synchronous audio and visual telemedicine encounter.      Reason for Telemedicine Visit: Services only offered telehealth    Originating Site (Patient Location): Patient's home    Distant Site (Provider Location): Provider Remote Setting- Home Office    Consent:  The patient/guardian has verbally consented to: the potential risks and benefits of telemedicine (video visit) versus in person care; bill my insurance or make self-payment for services provided; and responsibility for payment of non-covered services.     Patient would like the video invitation sent by:  My Chart    Mode of Communication:  Video Conference via Medical Zoom    As the provider I attest to compliance with applicable laws and regulations related to telemedicine.                Data:    Session content: At the start of this group, patients were invited to check in by identifying themselves, describing their current emotional status, and identifying issues to  address in this group.   Area(s) of treatment focus addressed in this session included Symptom Management, Personal Safety, Develop / Improve Independent Living Skills and Develop Socialization / Interpersonal Relationship Skills.  Kit reported bridal shower was a positive experience so she feels relieved, accomplished, and proud of herself.  She also reported  enjoying spending time with grandma.  She noted mood was pretty good but also anxious as she is planning to meet with a new individual therapist today.  She reported plans to go to cabin the rest of the week and will miss group.  She has coping plan created to help incase suicidal thoughts increase as is location/anniversary of attempt last year.  She reported current parasuicidal ideation with no active thoughts, plans, or intent.  Is able to commit to safety.  Reported finding it helpful to focus on staying in the moment with things she is looking forward to like seeing a friend she hasn t seen in a while  She reported feeling grateful for her rabbit.  She set goal to pack but noted low motivation as barrier.  Reported check lists seem to be helpful to her.    Therapeutic Interventions/Treatment Strategies:  Psychotherapist offered support, feedback and validation and reinforced use of skills. Treatment modalities used include Motivational Interviewing, Cognitive Behavioral Therapy and Dialectical Behavioral Therapy. Validated and normalized.  Highlighted and reinforced skills used; connected to positive outcomes.  Provided additional skill suggestions.  Aided in creating a plan to help work towards goals.        Assessment:    Patient response:   Patient responded to session by accepting feedback, giving feedback, listening, focusing on goals, being attentive and accepting support    Possible barriers to participation / learning include: and no barriers identified    Health Issues:   None reported       Substance Use Review:   Substance Use: No active  concerns identified.    Mental Status/Behavioral Observations  Appearance:   Appropriate   Eye Contact:   Good   Psychomotor Behavior: Normal   Attitude:   Cooperative   Orientation:   All  Speech   Rate / Production: Normal    Volume:  Normal   Mood:    Anxious  Depressed  Normal  Affect:    Appropriate   Thought Content:   Rumination and Safety reports  presence of suicidal ideation passive suicidal ideation   Thought Form:  Coherent  Logical     Insight:    Good     Plan:     Safety Plan: Committed to safety and agreed to follow previously developed safety coping plan.      Barriers to treatment: None identified    Patient Contracts (see media tab):  None    Substance Use: Not addressed in session     Continue or Discharge: Patient will continue in Adult Day Treatment (ADT)  as planned. Patient is likely to benefit from learning and using skills as they work toward the goals identified in their treatment plan.      Subha Murphy  August 15, 2022

## 2022-08-15 NOTE — PROGRESS NOTES
Acknowledgement of Current Treatment Plan       Therapist unable to review with patient. Sent a copy of the plan via My Chart on 8/15/22 (1A)    Name:      Signature:  Rafaela NAHUM Mckeon Unable to sign due to Virtual and COVID     Gabe Velez MD  Psychiatrist/Medical Director Gabe Velez MD on 8/16/2022 at 8:24 AM   Subha Murphy [facility]  Psychotherapist Subha Murphy [facility] on 8/16/2022 at 8:15 AM    Kwame Daniels OTR/BERKLEY Galvez/L on 8/15/2022 at 1:16 PM

## 2022-08-22 ENCOUNTER — HOSPITAL ENCOUNTER (OUTPATIENT)
Dept: BEHAVIORAL HEALTH | Facility: CLINIC | Age: 22
Discharge: HOME | End: 2022-08-22
Attending: PSYCHIATRY & NEUROLOGY
Payer: COMMERCIAL

## 2022-08-22 PROCEDURE — 90853 GROUP PSYCHOTHERAPY: CPT | Mod: GT,95 | Performed by: SOCIAL WORKER

## 2022-08-22 PROCEDURE — 90853 GROUP PSYCHOTHERAPY: CPT | Mod: GT,95

## 2022-08-22 ASSESSMENT — ANXIETY QUESTIONNAIRES
GAD7 TOTAL SCORE: 15
7. FEELING AFRAID AS IF SOMETHING AWFUL MIGHT HAPPEN: SEVERAL DAYS
2. NOT BEING ABLE TO STOP OR CONTROL WORRYING: MORE THAN HALF THE DAYS
3. WORRYING TOO MUCH ABOUT DIFFERENT THINGS: MORE THAN HALF THE DAYS
7. FEELING AFRAID AS IF SOMETHING AWFUL MIGHT HAPPEN: SEVERAL DAYS
GAD7 TOTAL SCORE: 15
5. BEING SO RESTLESS THAT IT IS HARD TO SIT STILL: NEARLY EVERY DAY
3. WORRYING TOO MUCH ABOUT DIFFERENT THINGS: MORE THAN HALF THE DAYS
GAD7 TOTAL SCORE: 15
8. IF YOU CHECKED OFF ANY PROBLEMS, HOW DIFFICULT HAVE THESE MADE IT FOR YOU TO DO YOUR WORK, TAKE CARE OF THINGS AT HOME, OR GET ALONG WITH OTHER PEOPLE?: VERY DIFFICULT
7. FEELING AFRAID AS IF SOMETHING AWFUL MIGHT HAPPEN: SEVERAL DAYS
GAD7 TOTAL SCORE: 15
IF YOU CHECKED OFF ANY PROBLEMS ON THIS QUESTIONNAIRE, HOW DIFFICULT HAVE THESE PROBLEMS MADE IT FOR YOU TO DO YOUR WORK, TAKE CARE OF THINGS AT HOME, OR GET ALONG WITH OTHER PEOPLE: VERY DIFFICULT
1. FEELING NERVOUS, ANXIOUS, OR ON EDGE: NEARLY EVERY DAY
8. IF YOU CHECKED OFF ANY PROBLEMS, HOW DIFFICULT HAVE THESE MADE IT FOR YOU TO DO YOUR WORK, TAKE CARE OF THINGS AT HOME, OR GET ALONG WITH OTHER PEOPLE?: VERY DIFFICULT
4. TROUBLE RELAXING: NEARLY EVERY DAY
2. NOT BEING ABLE TO STOP OR CONTROL WORRYING: MORE THAN HALF THE DAYS
6. BECOMING EASILY ANNOYED OR IRRITABLE: SEVERAL DAYS
GAD7 TOTAL SCORE: 15
3. WORRYING TOO MUCH ABOUT DIFFERENT THINGS: MORE THAN HALF THE DAYS
7. FEELING AFRAID AS IF SOMETHING AWFUL MIGHT HAPPEN: SEVERAL DAYS
1. FEELING NERVOUS, ANXIOUS, OR ON EDGE: NEARLY EVERY DAY
GAD7 TOTAL SCORE: 15
IF YOU CHECKED OFF ANY PROBLEMS ON THIS QUESTIONNAIRE, HOW DIFFICULT HAVE THESE PROBLEMS MADE IT FOR YOU TO DO YOUR WORK, TAKE CARE OF THINGS AT HOME, OR GET ALONG WITH OTHER PEOPLE: VERY DIFFICULT
2. NOT BEING ABLE TO STOP OR CONTROL WORRYING: MORE THAN HALF THE DAYS
7. FEELING AFRAID AS IF SOMETHING AWFUL MIGHT HAPPEN: SEVERAL DAYS
6. BECOMING EASILY ANNOYED OR IRRITABLE: SEVERAL DAYS
IF YOU CHECKED OFF ANY PROBLEMS ON THIS QUESTIONNAIRE, HOW DIFFICULT HAVE THESE PROBLEMS MADE IT FOR YOU TO DO YOUR WORK, TAKE CARE OF THINGS AT HOME, OR GET ALONG WITH OTHER PEOPLE: VERY DIFFICULT
IF YOU CHECKED OFF ANY PROBLEMS ON THIS QUESTIONNAIRE, HOW DIFFICULT HAVE THESE PROBLEMS MADE IT FOR YOU TO DO YOUR WORK, TAKE CARE OF THINGS AT HOME, OR GET ALONG WITH OTHER PEOPLE: VERY DIFFICULT
8. IF YOU CHECKED OFF ANY PROBLEMS, HOW DIFFICULT HAVE THESE MADE IT FOR YOU TO DO YOUR WORK, TAKE CARE OF THINGS AT HOME, OR GET ALONG WITH OTHER PEOPLE?: VERY DIFFICULT
7. FEELING AFRAID AS IF SOMETHING AWFUL MIGHT HAPPEN: SEVERAL DAYS
4. TROUBLE RELAXING: NEARLY EVERY DAY
4. TROUBLE RELAXING: NEARLY EVERY DAY
GAD7 TOTAL SCORE: 15
1. FEELING NERVOUS, ANXIOUS, OR ON EDGE: NEARLY EVERY DAY
2. NOT BEING ABLE TO STOP OR CONTROL WORRYING: MORE THAN HALF THE DAYS
GAD7 TOTAL SCORE: 15
7. FEELING AFRAID AS IF SOMETHING AWFUL MIGHT HAPPEN: SEVERAL DAYS
6. BECOMING EASILY ANNOYED OR IRRITABLE: SEVERAL DAYS
1. FEELING NERVOUS, ANXIOUS, OR ON EDGE: NEARLY EVERY DAY
5. BEING SO RESTLESS THAT IT IS HARD TO SIT STILL: NEARLY EVERY DAY
6. BECOMING EASILY ANNOYED OR IRRITABLE: SEVERAL DAYS
5. BEING SO RESTLESS THAT IT IS HARD TO SIT STILL: NEARLY EVERY DAY
GAD7 TOTAL SCORE: 15
8. IF YOU CHECKED OFF ANY PROBLEMS, HOW DIFFICULT HAVE THESE MADE IT FOR YOU TO DO YOUR WORK, TAKE CARE OF THINGS AT HOME, OR GET ALONG WITH OTHER PEOPLE?: VERY DIFFICULT
GAD7 TOTAL SCORE: 15
7. FEELING AFRAID AS IF SOMETHING AWFUL MIGHT HAPPEN: SEVERAL DAYS
5. BEING SO RESTLESS THAT IT IS HARD TO SIT STILL: NEARLY EVERY DAY
3. WORRYING TOO MUCH ABOUT DIFFERENT THINGS: MORE THAN HALF THE DAYS
GAD7 TOTAL SCORE: 15
GAD7 TOTAL SCORE: 15
4. TROUBLE RELAXING: NEARLY EVERY DAY

## 2022-08-22 NOTE — GROUP NOTE
Process Group Note    PATIENT'S NAME: Rafaela Mckeon  MRN:   [id number]  :   2000  ACCT. NUMBER: [id number]  DATE OF SERVICE: 22  START TIME:  9:00 AM  END TIME:  9:50 AM  FACILITATOR: Subha Murphy  TOPIC:  Process Group    Diagnoses:  295.70  (F25.1) Schizoaffective Disorder Depressive Type  309.81 (F43.10) Posttraumatic Stress Disorder (includes Posttraumatic Stress Disorder for Children 6 Years and Younger)  With dissociative symptoms.  4. Other Diagnoses that is relevant to services:   296.32 (F33.1) Major Depressive Disorder, Recurrent Episode, Moderate _  300.02 (F41.1) Generalized Anxiety Disorder  300.3 (F42) Unspecified Obsessive Compulsive and Related Disorder  307.50 (F50.9) Unspecified Feeding or Eating Disorder.      [facility] Mental [facility] Day Treatment  TRACK: 1A    NUMBER OF PARTICIPANTS: 5                                      Service Modality:  Video Visit     Telemedicine Visit: The patient's condition can be safely assessed and treated via synchronous audio and visual telemedicine encounter.      Reason for Telemedicine Visit: Services only offered telehealth    Originating Site (Patient Location): Patient's home    Distant Site (Provider Location): Provider Remote Setting- Home Office    Consent:  The patient/guardian has verbally consented to: the potential risks and benefits of telemedicine (video visit) versus in person care; bill my insurance or make self-payment for services provided; and responsibility for payment of non-covered services.     Patient would like the video invitation sent by:  My Chart    Mode of Communication:  Video Conference via Medical Zoom    As the provider I attest to compliance with applicable laws and regulations related to telemedicine.                Data:    Session content: At the start of this group, patients were invited to check in by identifying themselves, describing their current emotional status, and identifying issues to  address in this group.   Area(s) of treatment focus addressed in this session included Symptom Management, Personal Safety, Develop / Improve Independent Living Skills and Develop Socialization / Interpersonal Relationship Skills.  Jewel reported returning from cabin early due to high anxiety and panic; friend came with her.  Reported feeling overstimulated and agreed to hypervigilance while experiencing distress.  Noted feeling overwhelmed by sounds and tried to take breaks to cope.  Was able to receive credit for finding a balance between not going to cabin at all and staying the whole time if she was anxious. Upon coming home, she stayed with mom a few days and now sister is staying with her a few days.  She reported suicidal thoughts with no plan or intent.  Is able to commit to safety.  She feels ongoing time with others will be helpful to her.     Therapeutic Interventions/Treatment Strategies:  Psychotherapist offered support, feedback and validation and reinforced use of skills. Treatment modalities used include Motivational Interviewing, Cognitive Behavioral Therapy and Dialectical Behavioral Therapy. Validated and normalized.  Highlighted and reinforced skills used; connected to positive outcomes.  Provided additional skill suggestions.  Aided in creating a plan to help work towards goals.        Assessment:    Patient response:   Patient responded to session by accepting feedback, giving feedback, listening, focusing on goals, being attentive and accepting support    Possible barriers to participation / learning include: and no barriers identified    Health Issues:   None reported       Substance Use Review:   Substance Use: No active concerns identified.    Mental Status/Behavioral Observations  Appearance:   Appropriate   Eye Contact:   Good   Psychomotor Behavior: Normal   Attitude:   Cooperative   Orientation:   All  Speech   Rate / Production: Normal    Volume:  Soft   Mood:    Anxious  Depressed    Affect:    Appropriate   Thought Content:   Rumination and Safety reports  presence of suicidal ideation passive suicidal ideation  and thoughts of self-harm  Thought Form:  Coherent  Logical     Insight:    Good     Plan:     Safety Plan: Committed to safety and agreed to follow previously developed safety coping plan.      Barriers to treatment: None identified    Patient Contracts (see media tab):  None    Substance Use: Not addressed in session     Continue or Discharge: Patient will continue in Adult Day Treatment (ADT)  as planned. Patient is likely to benefit from learning and using skills as they work toward the goals identified in their treatment plan.      Subha Murphy  August 22, 2022

## 2022-08-22 NOTE — GROUP NOTE
Psychoeducation Group Note    PATIENT'S NAME: Rafaela Mckeon  MRN:   [id number]  :   2000  ACCT. NUMBER: [id number]  DATE OF SERVICE: 22  START TIME: 11:00 AM  END TIME: 11:50 AM  FACILITATOR: Morena Otoole, [facility]; David Daniels, BAMBIR/L  TOPIC: MH Life Skills Group: Communication and Social Skills Development  [facility] Day Treatment  TRACK: 1A    NUMBER OF PARTICIPANTS: 5    Summary of Group / Topics Discussed:  Communication and Social Skills Development: Communication Styles:  Patients were taught and increased awareness of verbal, nonverbal, and other styles and types of communication and how this impacts interactions with other people.  Patients were given an opportunity to build and practice effective communication skills to advocate and get their needs met directly.    Patient Session Goals / Objectives:    Identified their particular style of communication and how that impacts their ability to communicate with other people       Improved awareness of important aspects of communication and how this relates to mental health recovery        Established a plan for practice of these skills in their own environments    Practiced and reflected on how to generalize taught skills to their everyday life                                    Service Modality:  Video Visit     Telemedicine Visit: The patient's condition can be safely assessed and treated via synchronous audio and visual telemedicine encounter.      Reason for Telemedicine Visit: Services only offered telehealth    Originating Site (Patient Location): Patient's home    Distant Site (Provider Location): Provider Remote Setting- Home Office    Consent:  The patient/guardian has verbally consented to: the potential risks and benefits of telemedicine (video visit) versus in person care; bill my insurance or make self-payment for services provided; and responsibility for payment of non-covered services.      Patient would like the video invitation sent by:  My Chart    Mode of Communication:  Video Conference via Medical Zoom    As the provider I attest to compliance with applicable laws and regulations related to telemedicine.            Patient Participation / Response:  Fully participated with the group by sharing personal reflections / insights and openly received / provided feedback with other participants.    Verbalized understanding of content    Treatment Plan:  Patient has a current master individualized treatment plan.  See Epic treatment plan for more information.    Morena Otoole, SHAHIDASW

## 2022-08-22 NOTE — GROUP NOTE
Psychoeducation Group Note    PATIENT'S NAME: Rafaela Mckeon  MRN:   [id number]  :   2000  ACCT. NUMBER: [id number]  DATE OF SERVICE: 22  START TIME: 10:00 AM  END TIME: 10:50 AM  FACILITATOR: [facility]toole [facility]; Deanna Avila RN  TOPIC:  Wellness Group: Medication Education and Management  [facility] Day Treatment  TRACK: 1A    NUMBER OF PARTICIPANTS: 6    Summary of Group / Topics Discussed:  Medication Educations and Management:  Medication Jeopardy: Patients provided education regarding medication safety, antidepressants, side effects, neuroleptics, expected medication outcomes, knowledge of diagnosis, symptoms, and symptom management through an engaging jeopardy-style format.     Patient Session Goals / Objectives:    ? Participated in team-based Jeopardy game  ? Identified strategies for safe use, handling, and disposal of medications  ? Discussed basic aspects of medication safety, side effects, adverse outcomes and contraindications                                      Service Modality:  Video Visit     Telemedicine Visit: The patient's condition can be safely assessed and treated via synchronous audio and visual telemedicine encounter.      Reason for Telemedicine Visit: Services only offered telehealth    Originating Site (Patient Location): Patient's home    Distant Site (Provider Location): Provider Remote Setting- Home Office    Consent:  The patient/guardian has verbally consented to: the potential risks and benefits of telemedicine (video visit) versus in person care; bill my insurance or make self-payment for services provided; and responsibility for payment of non-covered services.     Patient would like the video invitation sent by:  My Chart    Mode of Communication:  Video Conference via Medical Zoom    As the provider I attest to compliance with applicable laws and regulations related to telemedicine.            Patient Participation  / Response:  Fully participated with the group by sharing personal reflections / insights and openly received / provided feedback with other participants.     Demonstrated understanding of topics discussed through group discussion and participation    Treatment Plan:  Patient has a current master individualized treatment plan.  See Epic treatment plan for more information.    Morena Otoole, SHAHIDASW

## 2022-08-24 ENCOUNTER — HOSPITAL ENCOUNTER (OUTPATIENT)
Dept: BEHAVIORAL HEALTH | Facility: CLINIC | Age: 22
Discharge: HOME | End: 2022-08-24
Attending: PSYCHIATRY & NEUROLOGY
Payer: COMMERCIAL

## 2022-08-24 PROCEDURE — 90853 GROUP PSYCHOTHERAPY: CPT | Mod: GT,95 | Performed by: SOCIAL WORKER

## 2022-08-24 PROCEDURE — 90853 GROUP PSYCHOTHERAPY: CPT | Mod: GT,95

## 2022-08-24 NOTE — GROUP NOTE
Process Group Note    PATIENT'S NAME: Rafaela Mckeon  MRN:   [id number]  :   2000  ACCT. NUMBER: [id number]  DATE OF SERVICE: 22  START TIME:  9:00 AM  END TIME:  9:50 AM  FACILITATOR: Subha Murphy  TOPIC:  Process Group    Diagnoses:  295.70  (F25.1) Schizoaffective Disorder Depressive Type  309.81 (F43.10) Posttraumatic Stress Disorder (includes Posttraumatic Stress Disorder for Children 6 Years and Younger)  With dissociative symptoms.  4. Other Diagnoses that is relevant to services:   296.32 (F33.1) Major Depressive Disorder, Recurrent Episode, Moderate _  300.02 (F41.1) Generalized Anxiety Disorder  300.3 (F42) Unspecified Obsessive Compulsive and Related Disorder  307.50 (F50.9) Unspecified Feeding or Eating Disorder.      [facility] Day Treatment  TRACK: 1A    NUMBER OF PARTICIPANTS: 4                                      Service Modality:  Video Visit     Telemedicine Visit: The patient's condition can be safely assessed and treated via synchronous audio and visual telemedicine encounter.      Reason for Telemedicine Visit: Services only offered telehealth    Originating Site (Patient Location): Patient's home    Distant Site (Provider Location): Provider Remote Setting- Home Office    Consent:  The patient/guardian has verbally consented to: the potential risks and benefits of telemedicine (video visit) versus in person care; bill my insurance or make self-payment for services provided; and responsibility for payment of non-covered services.     Patient would like the video invitation sent by:  My Chart    Mode of Communication:  Video Conference via Medical Zoom    As the provider I attest to compliance with applicable laws and regulations related to telemedicine.                Data:    Session content: At the start of this group, patients were invited to check in by identifying themselves, describing their current emotional status, and identifying issues to  address in this group.   Area(s) of treatment focus addressed in this session included Symptom Management, Personal Safety, Develop / Improve Independent Living Skills and Develop Socialization / Interpersonal Relationship Skills.  Jewel reported feeling anxious for a job interview at 11. She feels this is a job she really wants but does have two other job interviews lined up for this week.  She reported it is a phone interview so she feels able to engage in gidgets and walking around to help manage anxiety during interview.  She reported focusing on the job interview has helped her to manage depression despite anxiety feelings it brings.  Noted yesterday was anniversary of suicide attempt and tomorrow is birthday.  Spent last birthday in unconscious in ICU.  Set boundary with friends and family that she does not want to celebrate birthday and instead just wants to have a neutral day.  Made plans with friend to go hike.  Reported current SI is passive with no plan or intent but is aware she can spiral easily.  Feels able to follow safety plan and thinks it helps that friend, parents, and sister are all supportive.  She presents as future oriented re: job interview.  Reported no SIB urges or actions, no chemical health issues, no medication concerns.  She is grateful for her family being understanding and supportive.      Therapeutic Interventions/Treatment Strategies:  Psychotherapist offered support, feedback and validation and reinforced use of skills. Treatment modalities used include Motivational Interviewing, Cognitive Behavioral Therapy and Dialectical Behavioral Therapy. Validated and normalized.  Highlighted and reinforced skills used; connected to positive outcomes.  Provided additional skill suggestions.  Aided in creating a plan to help work towards goals.        Assessment:    Patient response:   Patient responded to session by accepting feedback, giving feedback, listening, focusing on goals, being  attentive and accepting support    Possible barriers to participation / learning include: and no barriers identified    Health Issues:   None reported       Substance Use Review:   Substance Use: No active concerns identified.    Mental Status/Behavioral Observations  Appearance:   Appropriate   Eye Contact:   Good   Psychomotor Behavior: Normal   Attitude:   Cooperative   Orientation:   All  Speech   Rate / Production: Normal    Volume:  Normal   Mood:    Anxious  Depressed   Affect:    Appropriate   Thought Content:   Rumination and Safety reports  presence of suicidal ideation passive suicidal ideation  and thoughts of self-harm  Thought Form:  Coherent  Logical     Insight:    Good     Plan:     Safety Plan: Committed to safety and agreed to follow previously developed safety coping plan.      Barriers to treatment: None identified    Patient Contracts (see media tab):  None    Substance Use: Not addressed in session     Continue or Discharge: Patient will continue in Adult Day Treatment (ADT)  as planned. Patient is likely to benefit from learning and using skills as they work toward the goals identified in their treatment plan.      Subha Murphy  August 24, 2022

## 2022-08-25 NOTE — GROUP NOTE
Psychoeducation Group Note    PATIENT'S NAME: Rafaela Mckeon  MRN:   [id number]  :   2000  ACCT. NUMBER: [id number]  DATE OF SERVICE: 22  START TIME: 10:00 AM  END TIME: 10:50 AM  FACILITATOR: Morena Otoole, FRANSICO; David Daniels, BERKLEY/L  TOPIC: MH Life Skills Group: Resiliency Development  [facility] Adult Mental Health Day Treatment  TRACK: 1A    NUMBER OF PARTICIPANTS: 5    Summary of Group / Topics Discussed:  Resiliency Development:  Coping Skills: Personal Recovery Inventory: Patients were taught how to identify coping strategies and routines that they can adopt and use for management with focus on a balance between physical, emotional, social and spiritual strategies.    Patient Session Goals / Objectives:    Identified personal definitions of recovery for effectively managing both mental health and substance abuse/abuse symptoms     Improved awareness of the process of recovery and skills and strategies that support this       Established a plan for practice of these skills in their own environments    Practiced and reflected on how to generalize taught skills to their everyday life                                        Service Modality:  Video Visit     Telemedicine Visit: The patient's condition can be safely assessed and treated via synchronous audio and visual telemedicine encounter.      Reason for Telemedicine Visit: Services only offered telehealth    Originating Site (Patient Location): Patient's home    Distant Site (Provider Location): Provider Remote Setting- Home Office    Consent:  The patient/guardian has verbally consented to: the potential risks and benefits of telemedicine (video visit) versus in person care; bill my insurance or make self-payment for services provided; and responsibility for payment of non-covered services.     Patient would like the video invitation sent by:  My Chart    Mode of Communication:  Video Conference via Medical Zoom    As  the provider I attest to compliance with applicable laws and regulations related to telemedicine.            Patient Participation / Response:  Fully participated with the group by sharing personal reflections / insights and openly received / provided feedback with other participants.    Verbalized understanding of content    Treatment Plan:  Patient has a current master individualized treatment plan.  See Epic treatment plan for more information.    Morena Otoole, LICSW

## 2022-08-26 ENCOUNTER — HOSPITAL ENCOUNTER (OUTPATIENT)
Dept: BEHAVIORAL HEALTH | Facility: CLINIC | Age: 22
Discharge: HOME | End: 2022-08-26
Attending: PSYCHIATRY & NEUROLOGY
Payer: COMMERCIAL

## 2022-08-26 PROCEDURE — 90853 GROUP PSYCHOTHERAPY: CPT | Mod: GT,95 | Performed by: SOCIAL WORKER

## 2022-08-29 ENCOUNTER — HOSPITAL ENCOUNTER (OUTPATIENT)
Dept: BEHAVIORAL HEALTH | Facility: CLINIC | Age: 22
Discharge: HOME | End: 2022-08-29
Attending: PSYCHIATRY & NEUROLOGY
Payer: COMMERCIAL

## 2022-08-29 PROCEDURE — 90853 GROUP PSYCHOTHERAPY: CPT | Mod: GT,95 | Performed by: SOCIAL WORKER

## 2022-08-29 PROCEDURE — 90853 GROUP PSYCHOTHERAPY: CPT | Mod: GT,95

## 2022-08-29 NOTE — GROUP NOTE
Process Group Note    PATIENT'S NAME: Rafaela Mckeon  MRN:   [id number]  :   2000  ACCT. NUMBER: [id number]  DATE OF SERVICE: 22  START TIME:  9:00 AM  END TIME:  9:50 AM  FACILITATOR: Morena Otoole LICSW  TOPIC:  Process Group    Diagnoses:  295.70  (F25.1) Schizoaffective Disorder Depressive Type  309.81 (F43.10) Posttraumatic Stress Disorder (includes Posttraumatic Stress Disorder for Children 6 Years and Younger)  With dissociative symptoms.  4. Other Diagnoses that is relevant to services:   296.32 (F33.1) Major Depressive Disorder, Recurrent Episode, Moderate _  300.02 (F41.1) Generalized Anxiety Disorder  300.3 (F42) Unspecified Obsessive Compulsive and Related Disorder  307.50 (F50.9) Unspecified Feeding or Eating Disorder.      [facility] Day Treatment  TRACK: 1A    NUMBER OF PARTICIPANTS: 5                              Service Modality:  Video Visit     Telemedicine Visit: The patient's condition can be safely assessed and treated via synchronous audio and visual telemedicine encounter.      Reason for Telemedicine Visit: Services only offered telehealth    Originating Site (Patient Location): Patient's home    Distant Site (Provider Location): Provider Remote Setting- Home Office    Consent:  The patient/guardian has verbally consented to: the potential risks and benefits of telemedicine (video visit) versus in person care; bill my insurance or make self-payment for services provided; and responsibility for payment of non-covered services.     Patient would like the video invitation sent by:  My Chart    Mode of Communication:  Video Conference via Medical Zoom    As the provider I attest to compliance with applicable laws and regulations related to telemedicine.          Data:    Session content: At the start of this group, patients were invited to check in by identifying themselves, describing their current emotional status, and identifying issues  "to address in this group.   Area(s) of treatment focus addressed in this session included Symptom Management and Develop Socialization / Interpersonal Relationship Skills.    Kit checked in as feeling overwhelmed.Took time today. Said she had her birthday yesterday and she did not want to celebrate it because this was the day she attempted last year and was in a coma. Said she doesn't remember her birthday. Had asked family to not give her presents or doing anything to celebrate it. Said her sister was the only one who listened to her boundary. \"I didn t want to pretend to be happy. I m really depressed.\" Said parents wanted to take her out to dinner and give her presents. \"I kind of ignored them because they didn t listen to me about not wanting to celebrate. Said passive SI was high yesterday. \"I was on the verge of crying all day. [I kept thinking] 'I wish I had just  the first time.' I needed distraction so I read and kept to myself.\" Expressed gratitude for her sister's support.     Therapeutic Interventions/Treatment Strategies:  Psychotherapist offered support, feedback and validation and reinforced use of skills. Treatment modalities used include Cognitive Behavioral Therapy and Dialectical Behavioral Therapy. Interventions include Behavioral Activation: Explored how behaviors effect mood and interact with thoughts and feelings and Encouraged strategies to reduce individual procrastination and increase motivation by increasing goal-directed activities to enhance mood and reduce symptoms. and Coping Skills: Assisted patient in understanding the purpose of planning / creating / participating / sharing in positive experiences.    Assessment:     Patient response:   Patient responded to session by accepting feedback, giving feedback, listening, focusing on goals, being attentive and accepting support     Possible barriers to participation / learning include: and no barriers identified     Health Issues:      "         None reported                  Substance Use Review:              Substance Use: No active concerns identified.    Mental Status/Behavioral Observations  Appearance:   Appropriate   Eye Contact:   Good   Psychomotor Behavior: Normal   Attitude:   Cooperative   Orientation:   All  Speech   Rate / Production: Normal    Volume:  Normal   Mood:    Anxious  Normal Dysphoric  Affect:    Appropriate  Constricted   Thought Content:   Clear denies SI, SIB or HI today  Thought Form:  Coherent  Logical     Insight:    Good     Plan:     Safety Plan: No current safety concerns identified.  Recommended that patient call 911 or go to the local ED should there be a change in any of these risk factors.     Barriers to treatment: None identified    Patient Contracts (see media tab):  None    Substance Use: Not addressed in session     Continue or Discharge: Patient will continue in Adult Day Treatment (ADT)  as planned. Patient is likely to benefit from learning and using skills as they work toward the goals identified in their treatment plan.      Morena Otoole, [facility]  August 29, 2022

## 2022-08-29 NOTE — GROUP NOTE
Psychotherapy Group Note    PATIENT'S NAME: Rafaela Mckeon  MRN:   [id number]  :   2000  ACCT. NUMBER: [id number]  DATE OF SERVICE: 22  START TIME: 11:00 AM  END TIME: 11:50 AM  FACILITATOR: Morena Otoole, FRANSICO; Rolanda Valentine OTR/L  TOPIC: MH EBP Group: Social Support  [facility] Adult Mental Health Day Treatment  TRACK: 1A    NUMBER OF PARTICIPANTS: 3    Summary of Group / Topics Discussed:  Social Support:  Building and educating social support systems: The patients engaged in a discussion around identifying qualities when want in our social supports, barriers to seeking support, and the things that make it easier to seek support from others. Group members also discussed what is helps them actively listen and accept feedback from others.     Patient Session Goals / Objectives:    Identify ways that support network can assist with recovery.     Identify what is important to patients when seeking support from others    Improve interpersonal communication     Learn about resources to increase/strengthen community support system                                    Service Modality:  Video Visit     Telemedicine Visit: The patient's condition can be safely assessed and treated via synchronous audio and visual telemedicine encounter.      Reason for Telemedicine Visit: Services only offered telehealth    Originating Site (Patient Location): Patient's home    Distant Site (Provider Location): Provider Remote Setting- Home Office    Consent:  The patient/guardian has verbally consented to: the potential risks and benefits of telemedicine (video visit) versus in person care; bill my insurance or make self-payment for services provided; and responsibility for payment of non-covered services.     Patient would like the video invitation sent by:  My Chart    Mode of Communication:  Video Conference via Medical Zoom    As the provider I attest to compliance with applicable laws and regulations  related to telemedicine.            Patient Participation / Response:  Fully participated with the group by sharing personal reflections / insights and openly received / provided feedback with other participants.     Verbalized understanding of content    Treatment Plan:  Patient has a current master individualized treatment plan.  See Epic treatment plan for more information.    Morena Otoole, LICSW

## 2022-08-29 NOTE — GROUP NOTE
Psychotherapy Group Note    PATIENT'S NAME: Rafaela Mckeon  MRN:   [id number]  :   2000  ACCT. NUMBER: [id number]  DATE OF SERVICE: 22  START TIME: 11:00 AM  END TIME: 11:50 AM  FACILITATOR: Morena Otoole LICSW  TOPIC: MH EBP Group: Symptom Awareness  [facility] Mental Health Day Treatment  TRACK: 1A    NUMBER OF PARTICIPANTS: 3    Summary of Group / Topics Discussed:  Symptom Awareness: Symptom Observation and Tracking: An overview of symptom observation and tracking was presented to help patients identify specific symptoms and identify patterns. This topic will also assist patient in identifying progress towards goal of decreasing severity of symptoms and increasing overall functioning. Patients completed a symptom check list in session. Patient was assisted in identifying baseline functioning, patterns, and ways to assess current symptoms. Patient was also assisted in identifying a tool or strategy to continue to track or monitor symptoms over a period of time.       Patient Session Goals / Objectives:    Identified patient individual symptoms and experiences    Identified potential symptom patterns and factors that contribute to changes in symptom severity                                      Service Modality:  Video Visit     Telemedicine Visit: The patient's condition can be safely assessed and treated via synchronous audio and visual telemedicine encounter.      Reason for Telemedicine Visit: Services only offered telehealth    Originating Site (Patient Location): Patient's home    Distant Site (Provider Location): Provider Remote Setting- Home Office    Consent:  The patient/guardian has verbally consented to: the potential risks and benefits of telemedicine (video visit) versus in person care; bill my insurance or make self-payment for services provided; and responsibility for payment of non-covered services.     Patient would like the video invitation sent by:  My  Chart    Mode of Communication:  Video Conference via Medical Zoom    As the provider I attest to compliance with applicable laws and regulations related to telemedicine.            Patient Participation / Response:  Fully participated with the group by sharing personal reflections / insights and openly received / provided feedback with other participants.    Demonstrated understanding of topics discussed through group discussion and participation and Demonstrated understanding of how information regarding symptoms can assist in management of symptoms    Treatment Plan:  Patient has a current master individualized treatment plan.  See Epic treatment plan for more information.    Morena Otoole, SHAHIDASW

## 2022-08-29 NOTE — GROUP NOTE
Psychoeducation Group Note    PATIENT'S NAME: Rafaela Mckeon  MRN:   [id number]  :   2000  ACCT. NUMBER: [id number]  DATE OF SERVICE: 22  START TIME: 10:00 AM  END TIME: 10:50 AM  FACILITATOR: Morena Otoole LICSW; Pascual Gayle RN  TOPIC:  Wellness Group: Medication Education and Management  [facility] Day Treatment  TRACK: 1A    NUMBER OF PARTICIPANTS: 4    Summary of Group / Topics Discussed:  Medication Educations and Management:  Medication Categories: Patient were provided with a brief overview of four major psychotropic medication categories. Expected effects, potential side effects, adverse reactions, and contraindications were discussed. Patients learned about how their medications work to treat their illness and reviewed safe medication management, handling, and disposal of medications.     Patient Session Goals / Objectives:  ? Listed the four major categories of psychotropic medications (antidepressants, antipsychotics, mood stabilizers, anti-anxiety)  ? Identified medications in each category and important adverse reactions/contraindications for use  ? Explained how the medications they take work to treat their illness                        Service Modality:  Video Visit     Telemedicine Visit: The patient's condition can be safely assessed and treated via synchronous audio and visual telemedicine encounter.      Reason for Telemedicine Visit: Services only offered telehealth    Originating Site (Patient Location): Patient's home    Distant Site (Provider Location): Provider Remote Setting- Home Office    Consent:  The patient/guardian has verbally consented to: the potential risks and benefits of telemedicine (video visit) versus in person care; bill my insurance or make self-payment for services provided; and responsibility for payment of non-covered services.     Patient would like the video invitation sent by:  My Chart    Mode of  Communication:  Video Conference via Medical Zoom    As the provider I attest to compliance with applicable laws and regulations related to telemedicine.            Patient Participation / Response:  Fully participated with the group by sharing personal reflections / insights and openly received / provided feedback with other participants.     Demonstrated understanding of topics discussed through group discussion and participation    Treatment Plan:  Patient has a current master individualized treatment plan.  See Epic treatment plan for more information.    Morena Otoole, LICSW

## 2022-08-29 NOTE — GROUP NOTE
Process Group Note    PATIENT'S NAME: Rafaela Mckeon  MRN:   [id number]  :   2000  ACCT. NUMBER: [id number]  DATE OF SERVICE: 22  START TIME:  9:00 AM  END TIME:  9:50 AM  FACILITATOR: Subha Murphy  TOPIC:  Process Group    Diagnoses:  295.70  (F25.1) Schizoaffective Disorder Depressive Type  309.81 (F43.10) Posttraumatic Stress Disorder (includes Posttraumatic Stress Disorder for Children 6 Years and Younger)  With dissociative symptoms.  4. Other Diagnoses that is relevant to services:   296.32 (F33.1) Major Depressive Disorder, Recurrent Episode, Moderate _  300.02 (F41.1) Generalized Anxiety Disorder  300.3 (F42) Unspecified Obsessive Compulsive and Related Disorder  307.50 (F50.9) Unspecified Feeding or Eating Disorder.      [facility] Day Treatment  TRACK: 1A                                      Service Modality:  Video Visit     Telemedicine Visit: The patient's condition can be safely assessed and treated via synchronous audio and visual telemedicine encounter.      Reason for Telemedicine Visit: Services only offered telehealth    Originating Site (Patient Location): Patient's home    Distant Site (Provider Location): Provider Remote Setting- Home Office    Consent:  The patient/guardian has verbally consented to: the potential risks and benefits of telemedicine (video visit) versus in person care; bill my insurance or make self-payment for services provided; and responsibility for payment of non-covered services.     Patient would like the video invitation sent by:  My Chart    Mode of Communication:  Video Conference via Medical Zoom    As the provider I attest to compliance with applicable laws and regulations related to telemedicine.          NUMBER OF PARTICIPANTS: 4          Data:    Session content: At the start of this group, patients were invited to check in by identifying themselves, describing their current emotional status, and identifying issues to  address in this group.   Area(s) of treatment focus addressed in this session included Symptom Management, Personal Safety, Develop / Improve Independent Living Skills and Develop Socialization / Interpersonal Relationship Skills.  Jewel reported feeling anxious about in person portion of soyny job interview later today as she very much wants this job.  She agrees this is largely anticipatory anxiety and once she gets there she feels able to do the job well.  She reported plans to skillfully distract and is coloring during group to help.  She reported plans to clean to help her get energy out.  She is also using aromatherapy diffuser to help.  Reported passive suicidal ideation and instead of self isolating reached out to friend.  She reported spending positive time with friend and dad.  Is able to commit to safety.  Drank one drink on 22st birthday.     Therapeutic Interventions/Treatment Strategies:  Psychotherapist offered support, feedback and validation and reinforced use of skills. Treatment modalities used include Motivational Interviewing, Cognitive Behavioral Therapy and Dialectical Behavioral Therapy. Validated and normalized.  Highlighted and reinforced skills used; connected to positive outcomes.  Provided additional skill suggestions.  Aided in creating a plan to help work towards goals.        Assessment:    Patient response:   Patient responded to session by accepting feedback, giving feedback, listening, focusing on goals, being attentive and accepting support    Possible barriers to participation / learning include: and no barriers identified    Health Issues:   None reported       Substance Use Review:   Substance Use: alcohol .  and Last use: i drink on 22st bday    Mental Status/Behavioral Observations  Appearance:   Appropriate   Eye Contact:   Good   Psychomotor Behavior: Normal   Attitude:   Cooperative   Orientation:   All  Speech   Rate / Production: Normal    Volume:  Normal    Mood:    Anxious  Depressed   Affect:    Appropriate   Thought Content:   Rumination and Safety reports  presence of suicidal ideation passive suicidal ideation   Thought Form:  Coherent  Logical     Insight:    Good     Plan:     Safety Plan: Committed to safety and agreed to follow previously developed safety coping plan.      Barriers to treatment: None identified    Patient Contracts (see media tab):  None    Substance Use: Not addressed in session     Continue or Discharge: Patient will continue in Adult Day Treatment (ADT)  as planned. Patient is likely to benefit from learning and using skills as they work toward the goals identified in their treatment plan.      Subha Murphy  August 29, 2022

## 2022-08-29 NOTE — GROUP NOTE
Psychotherapy Group Note    PATIENT'S NAME: Rafaela Mckeon  MRN:   [id number]  :   2000  ACCT. NUMBER: [id number]  DATE OF SERVICE: 22  START TIME: 10:00 AM  END TIME: 10:50 AM  FACILITATOR: Morena Otoole LICSW  TOPIC: MH EBP Group: Symptom Awareness  [facility] Day Treatment  TRACK: 1A    NUMBER OF PARTICIPANTS: 3    Summary of Group / Topics Discussed:  Symptom Awareness: Mood Disorders: Patients received a general overview of mood disorders including depressive disorders, anxiety disorders, and bipolar disorders and how it relates to their current symptoms. The purpose is to promote understanding of their diagnoses and how it impacts their functioning. Patients reviewed their current awareness of symptoms and diagnoses. Patients received information regarding diagnoses, etiology, cultural, and environmental factors as well as impact on functioning.     Patient Session Goals / Objectives:    Discussed patient individual symptoms and experiences    Reviewed diagnostic criteria and etiology of diagnoses                                       Service Modality:  Video Visit     Telemedicine Visit: The patient's condition can be safely assessed and treated via synchronous audio and visual telemedicine encounter.      Reason for Telemedicine Visit: Services only offered telehealth    Originating Site (Patient Location): Patient's home    Distant Site (Provider Location): Provider Remote Setting- Home Office    Consent:  The patient/guardian has verbally consented to: the potential risks and benefits of telemedicine (video visit) versus in person care; bill my insurance or make self-payment for services provided; and responsibility for payment of non-covered services.     Patient would like the video invitation sent by:  My Chart    Mode of Communication:  Video Conference via Medical Zoom    As the provider I attest to compliance with applicable laws and regulations  related to telemedicine.         Patient Participation / Response:  Fully participated with the group by sharing personal reflections / insights and openly received / provided feedback with other participants.    Demonstrated understanding of topics discussed through group discussion and participation    Treatment Plan:  Patient has a current master individualized treatment plan.  See Epic treatment plan for more information.    Morena Otoole, SHAHIDASW

## 2022-08-30 NOTE — ADDENDUM NOTE
Encounter addended by: Morena Otoole, [facility] on: 8/30/2022 8:14 AM   Actions taken: Charge Capture section accepted

## 2022-08-31 ENCOUNTER — HOSPITAL ENCOUNTER (OUTPATIENT)
Dept: BEHAVIORAL HEALTH | Facility: CLINIC | Age: 22
Discharge: HOME | End: 2022-08-31
Attending: PSYCHIATRY & NEUROLOGY
Payer: COMMERCIAL

## 2022-08-31 PROCEDURE — 90853 GROUP PSYCHOTHERAPY: CPT | Mod: GT,95 | Performed by: SOCIAL WORKER

## 2022-08-31 PROCEDURE — 90853 GROUP PSYCHOTHERAPY: CPT | Mod: GT,95

## 2022-08-31 NOTE — GROUP NOTE
Psychoeducation Group Note    PATIENT'S NAME: Rafaela Mckeon  MRN:   [id number]  :   2000  ACCT. NUMBER: [id number]  DATE OF SERVICE: 22  START TIME: 10:00 AM  END TIME: 10:50 AM  FACILITATOR: David Daniels OTR/L; Thad Donnelly  TOPIC:  Life Skills Group: Life Skills  [facility] Adult Mental Health Day Treatment  TRACK: 1A    NUMBER OF PARTICIPANTS: 4                                      Service Modality:  Video Visit     Telemedicine Visit: The patient's condition can be safely assessed and treated via synchronous audio and visual telemedicine encounter.      Reason for Telemedicine Visit: Services only offered telehealth    Originating Site (Patient Location): Patient's home    Distant Site (Provider Location): Provider Remote Setting- Home Office    Consent:  The patient/guardian has verbally consented to: the potential risks and benefits of telemedicine (video visit) versus in person care; bill my insurance or make self-payment for services provided; and responsibility for payment of non-covered services.     Patient would like the video invitation sent by:  My Chart    Mode of Communication:  Video Conference via Medical Zoom    As the provider I attest to compliance with applicable laws and regulations related to telemedicine.     Summary of Group / Topics Discussed:  Resiliency Development:  Coping Skills: Patients were taught how to identify stressors, signs of stress, coping skills, and prevention strategies for overall stress management.  Patients were given the opportunity to identify both ongoing and acute mental health symptoms and how to effectively manage these symptoms by developing an effective aftercare plan.  Patients increased awareness of community based resources.    Patient Session Goals / Objectives:    Identified how using coping skills can be used for symptom and stress management       Improved awareness of individualed symptoms and stressors and how to  effectively cope     Established a relapse prevention plan to practice these skills in their own environments    Practiced and reflected on how to generalize taught skills to their everyday life        Patient Participation / Response:  Fully participated with the group by sharing personal reflections / insights and openly received / provided feedback with other participants.    Demonstrated understanding of content through participation in experiential exercise , Verbalized understanding of content and Patient worked towards initial treatment plan goals     Treatment Plan:  Patient has a current master individualized treatment plan.  See Epic treatment plan for more information.    Thad Donnelly, LPCC, LADC

## 2022-08-31 NOTE — GROUP NOTE
Psychoeducation Group Note    PATIENT'S NAME: Rafaela Mckeon  MRN:   [id number]  :   2000  ACCT. NUMBER: [id number]  DATE OF SERVICE: 22  START TIME: 11:00 AM  END TIME: 11:50 AM  FACILITATOR: Deanna Avila RN; Thad Donnelly  TOPIC:  Wellness Group: Medication Education and Management  [facility] Day Treatment  TRACK: 1A    NUMBER OF PARTICIPANTS: 4                                      Service Modality:  Video Visit     Telemedicine Visit: The patient's condition can be safely assessed and treated via synchronous audio and visual telemedicine encounter.      Reason for Telemedicine Visit: Services only offered telehealth    Originating Site (Patient Location): Patient's home    Distant Site (Provider Location): Provider Remote Setting- Home Office    Consent:  The patient/guardian has verbally consented to: the potential risks and benefits of telemedicine (video visit) versus in person care; bill my insurance or make self-payment for services provided; and responsibility for payment of non-covered services.     Patient would like the video invitation sent by:  My Chart    Mode of Communication:  Video Conference via Medical Zoom    As the provider I attest to compliance with applicable laws and regulations related to telemedicine.     Summary of Group / Topics Discussed:  Medication Educations and Management:  Medication Categories: Patient were provided with a brief overview of four major psychotropic medication categories. Expected effects, potential side effects, adverse reactions, and contraindications were discussed. Patients learned about how their medications work to treat their illness and reviewed safe medication management, handling, and disposal of medications.     Patient Session Goals / Objectives:  ? Listed the four major categories of psychotropic medications (antidepressants, antipsychotics, mood stabilizers, anti-anxiety)  ? Identified medications in  each category and important adverse reactions/contraindications for use  ? Explained how the medications they take work to treat their illness       Patient Participation / Response:  Fully participated with the group by sharing personal reflections / insights and openly received / provided feedback with other participants.     Demonstrated understanding of topics discussed through group discussion and participation    Treatment Plan:  Patient has a current master individualized treatment plan.  See Epic treatment plan for more information.    Thad Donnelly, LPCC, LADC

## 2022-09-01 NOTE — GROUP NOTE
Process Group Note    PATIENT'S NAME: Rafaela Mckeon  MRN:   [id number]  :   2000  ACCT. NUMBER: [id number]  DATE OF SERVICE: 22  START TIME:  9:00 AM  END TIME:  9:50 AM  FACILITATOR: Morena Otoole LICSW  TOPIC:  Process Group    Diagnoses:  295.70  (F25.1) Schizoaffective Disorder Depressive Type  309.81 (F43.10) Posttraumatic Stress Disorder (includes Posttraumatic Stress Disorder for Children 6 Years and Younger)  With dissociative symptoms.  4. Other Diagnoses that is relevant to services:   296.32 (F33.1) Major Depressive Disorder, Recurrent Episode, Moderate _  300.02 (F41.1) Generalized Anxiety Disorder  300.3 (F42) Unspecified Obsessive Compulsive and Related Disorder  307.50 (F50.9) Unspecified Feeding or Eating Disorder.      [facility] Day Treatment  TRACK: 1A    NUMBER OF PARTICIPANTS: 2                                      Service Modality:  Video Visit     Telemedicine Visit: The patient's condition can be safely assessed and treated via synchronous audio and visual telemedicine encounter.      Reason for Telemedicine Visit: Services only offered telehealth    Originating Site (Patient Location): Patient's home    Distant Site (Provider Location): Provider Remote Setting- Home Office    Consent:  The patient/guardian has verbally consented to: the potential risks and benefits of telemedicine (video visit) versus in person care; bill my insurance or make self-payment for services provided; and responsibility for payment of non-covered services.     Patient would like the video invitation sent by:  My Chart    Mode of Communication:  Video Conference via Medical Zoom    As the provider I attest to compliance with applicable laws and regulations related to telemedicine.            Data:    Session content: At the start of this group, patients were invited to check in by identifying themselves, describing their current emotional status, and  "identifying issues to address in this group.   Area(s) of treatment focus addressed in this session included Symptom Management.    Kit checked in as \"okay\" and \"anxious.\" Writer clarified the emotion as nervous, which would be appropriate in the face of an interview about a job she wants and cares about. She said, \"I'm in love with these kids\" and smiled. Really hoping she will get the position and has the 2nd interview at 3:30PM. Denied safety concerns or substance abuse and reports taking meds as prescribed. Plans on using affirmations and grounding skills prior to interview to help her do her best.     Therapeutic Interventions/Treatment Strategies:2  Psychotherapist offered support, feedback and validation and reinforced use of skills. Treatment modalities used include Dialectical Behavioral Therapy. Interventions include Behavioral Activation: Explored how behaviors effect mood and interact with thoughts and feelings and Cognitive Restructuring:  Assisted patient in formulating new neutral/positive alternatives to challenge less helpful / ineffective thoughts and Assisted patient in identifying new neutral/positive core beliefs.    Assessment:    Patient response:   Patient responded to session by accepting feedback, giving feedback, listening, being attentive, accepting support and appearing alert    Possible barriers to participation / learning include: and no barriers identified  Mental Status/Behavioral Observations  Appearance:                            Appropriate   Eye Contact:                           Good   Psychomotor Behavior:          Normal   Attitude:                                   Cooperative   Orientation:                             All  Speech              Rate / Production:       Normal               Volume:                       Normal   Mood:                                      Anxious  Depressed   Affect:                                      Appropriate   Thought Content:            "         Rumination and Safety reports  presence of suicidal ideation passive suicidal ideation  and thoughts of self-harm  Thought Form:                        Coherent  Logical     Insight:                                     Good     Health Issues:   None reported (no new health issues reported in addition to existing, ongoing concerns)       Substance Use Review:   Substance Use: No active concerns identified.    Mental Status/Behavioral Observations  Appearance:   Appropriate   Eye Contact:   Good   Psychomotor Behavior: Normal   Attitude:   Cooperative  Pleasant  Orientation:   All  Speech   Rate / Production: Normal    Volume:  Normal   Mood:    Normal and Nervous and excited about 2nd interview today  Affect:    Appropriate   Thought Content:   Clear  Thought Form:  Coherent  Logical     Insight:    Good     Plan:     Safety Plan: No current safety concerns identified.  Recommended that patient call 911 or go to the local ED should there be a change in any of these risk factors.     Barriers to treatment: None identified    Patient Contracts (see media tab):  None    Substance Use: Not addressed in session     Continue or Discharge: Patient will continue in Adult Day Treatment (ADT)  as planned. Patient is likely to benefit from learning and using skills as they work toward the goals identified in their treatment plan.      Morena Otoole, [facility]  September 1, 2022

## 2022-09-02 ENCOUNTER — HOSPITAL ENCOUNTER (OUTPATIENT)
Dept: BEHAVIORAL HEALTH | Facility: CLINIC | Age: 22
Discharge: HOME | End: 2022-09-02
Attending: PSYCHIATRY & NEUROLOGY
Payer: COMMERCIAL

## 2022-09-02 PROCEDURE — 90853 GROUP PSYCHOTHERAPY: CPT | Mod: GT,95 | Performed by: SOCIAL WORKER

## 2022-09-05 NOTE — GROUP NOTE
Process Group Note    PATIENT'S NAME: Rafaela Mckeon  MRN:   [id number]  :   2000  ACCT. NUMBER: [id number]  DATE OF SERVICE: 22  START TIME:  9:00 AM  END TIME:  9:50 AM  FACILITATOR: Morena Otoole LICSW  TOPIC:  Process Group    Diagnoses:  295.70  (F25.1) Schizoaffective Disorder Depressive Type  309.81 (F43.10) Posttraumatic Stress Disorder (includes Posttraumatic Stress Disorder for Children 6 Years and Younger)  With dissociative symptoms.  4. Other Diagnoses that is relevant to services:   296.32 (F33.1) Major Depressive Disorder, Recurrent Episode, Moderate _  300.02 (F41.1) Generalized Anxiety Disorder  300.3 (F42) Unspecified Obsessive Compulsive and Related Disorder  307.50 (F50.9) Unspecified Feeding or Eating Disorder.      [facility] Day Treatment  TRACK: 1A    NUMBER OF PARTICIPANTS: 4                                  Service Modality:  Video Visit     Telemedicine Visit: The patient's condition can be safely assessed and treated via synchronous audio and visual telemedicine encounter.      Reason for Telemedicine Visit: Services only offered telehealth    Originating Site (Patient Location): Patient's home    Distant Site (Provider Location): Provider Remote Setting- Home Office    Consent:  The patient/guardian has verbally consented to: the potential risks and benefits of telemedicine (video visit) versus in person care; bill my insurance or make self-payment for services provided; and responsibility for payment of non-covered services.     Patient would like the video invitation sent by:  My Chart    Mode of Communication:  Video Conference via Medical Zoom    As the provider I attest to compliance with applicable laws and regulations related to telemedicine.          Data:    Session content: At the start of this group, patients were invited to check in by identifying themselves, describing their current emotional status, and identifying  "issues to address in this group.   Area(s) of treatment focus addressed in this session included Symptom Management. New group member started today. Asked group to take turns sharing their name, pronouns and sharing a \"fun fact\" about themselves.    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 Goal is to \"get outside today.\" Barrier may be \"anxiety.\" No substance use, taking medication as prescribed. Said she showed up at her mom's house crying and is there now. Denies safety concerns, and will call her mom (at work) and seek support if this changes.     Was sitting in a dark room, said she's \"having a migraine\" currently. Initially wanted to take processing time, but then declined later due to not feeling well. Left group about 9:30 with plans to return next week.     Therapeutic Interventions/Treatment Strategies:  Psychotherapist offered support, feedback and validation and reinforced use of skills. Treatment modalities used include Cognitive Behavioral Therapy and Dialectical Behavioral Therapy. Interventions include Behavioral Activation: Explored how behaviors effect mood and interact with thoughts and feelings and Coping Skills: Discussed use of self-soothe skills to decrease distress in the body, Assisted patient in identifying 1-2 healthy distraction skills to reduce overall distress, Reviewed patients current calming practices and discussed a more formal way of practicing and accessing skills and Promoted understanding of how and when to apply grounding strategies to reduce distress and increase presence in the moment.    Assessment:    Patient response:   Patient responded to session by accepting feedback, listening, being attentive and accepting support    Possible barriers to participation / learning include: and no barriers identified    Health Issues:   Yes: chronic " health concerns (no new reported today)       Substance Use Review:   Substance Use: No active concerns identified.    Mental Status/Behavioral Observations  Appearance:   Appropriate   Eye Contact:   Good   Psychomotor Behavior: Normal   Attitude:   Cooperative   Orientation:   All  Speech   Rate / Production: Normal    Volume:  Soft   Mood:    Anxious  Depressed  Sad   Affect:    Appropriate  Tearful  Thought Content:   Clear  Thought Form:  Coherent  Logical     Insight:    Good     Plan:     Safety Plan: No current safety concerns identified.  Recommended that patient call 911 or go to the local ED should there be a change in any of these risk factors.     Barriers to treatment: None identified    Patient Contracts (see media tab):  None    Substance Use: Not addressed in session     Continue or Discharge: Patient will continue in Adult Day Treatment (ADT)  as planned. Patient is likely to benefit from learning and using skills as they work toward the goals identified in their treatment plan.      Morena Otoole, [facility]  September 5, 2022

## 2022-09-07 ENCOUNTER — TELEPHONE (OUTPATIENT)
Dept: BEHAVIORAL HEALTH | Facility: CLINIC | Age: 22
End: 2022-09-07

## 2022-09-07 ENCOUNTER — HOSPITAL ENCOUNTER (OUTPATIENT)
Dept: BEHAVIORAL HEALTH | Facility: CLINIC | Age: 22
Discharge: HOME | End: 2022-09-07
Attending: PSYCHIATRY & NEUROLOGY
Payer: COMMERCIAL

## 2022-09-07 PROCEDURE — 90853 GROUP PSYCHOTHERAPY: CPT | Mod: GT,95 | Performed by: SOCIAL WORKER

## 2022-09-07 PROCEDURE — 90853 GROUP PSYCHOTHERAPY: CPT | Mod: GT,95

## 2022-09-07 ASSESSMENT — COLUMBIA-SUICIDE SEVERITY RATING SCALE - C-SSRS
4. HAVE YOU HAD THESE THOUGHTS AND HAD SOME INTENTION OF ACTING ON THEM?: NO
1. IN THE PAST MONTH, HAVE YOU WISHED YOU WERE DEAD OR WISHED YOU COULD GO TO SLEEP AND NOT WAKE UP?: NO
6. HAVE YOU EVER DONE ANYTHING, STARTED TO DO ANYTHING, OR PREPARED TO DO ANYTHING TO END YOUR LIFE?: YES
3. HAVE YOU BEEN THINKING ABOUT HOW YOU MIGHT KILL YOURSELF?: NO
2. HAVE YOU ACTUALLY HAD ANY THOUGHTS OF KILLING YOURSELF IN THE PAST MONTH?: NO
5. HAVE YOU STARTED TO WORK OUT OR WORKED OUT THE DETAILS OF HOW TO KILL YOURSELF? DO YOU INTEND TO CARRY OUT THIS PLAN?: NO

## 2022-09-07 NOTE — GROUP NOTE
Psychoeducation Group Note    PATIENT'S NAME: Rafaeal Mckeon  MRN:   [id number]  :   2000  ACCT. NUMBER: [id number]  DATE OF SERVICE: 22  START TIME: 10:00 AM  END TIME: 10:50 AM  FACILITATOR: David Daniels OTR/L; Thad Donnelly  TOPIC: MH Life Skills Group: Resiliency Development  [facility] Adult Mental [facility] Day Treatment  TRACK: 1A    NUMBER OF PARTICIPANTS: 5                                      Service Modality:  Video Visit     Telemedicine Visit: The patient's condition can be safely assessed and treated via synchronous audio and visual telemedicine encounter.      Reason for Telemedicine Visit: Services only offered telehealth    Originating Site (Patient Location): Patient's home    Distant Site (Provider Location): Provider Remote Setting- Home Office    Consent:  The patient/guardian has verbally consented to: the potential risks and benefits of telemedicine (video visit) versus in person care; bill my insurance or make self-payment for services provided; and responsibility for payment of non-covered services.     Patient would like the video invitation sent by:  My Chart    Mode of Communication:  Video Conference via Medical Zoom    As the provider I attest to compliance with applicable laws and regulations related to telemedicine.          Summary of Group / Topics Discussed:  Resiliency Development:  Coping Skills: Personal Recovery Inventory: Patients were taught how to identify coping strategies and routines that they can adopt and use for management with focus on a balance between physical, emotional, social and spiritual strategies.    Patient Session Goals / Objectives:    Identified personal definitions of recovery for effectively managing both mental health and substance abuse/abuse symptoms     Improved awareness of the process of recovery and skills and strategies that support this       Established a plan for practice of these skills in their own  environments    Practiced and reflected on how to generalize taught skills to their everyday life      Patient Participation / Response:  Fully participated with the group by sharing personal reflections / insights and openly received / provided feedback with other participants.    Demonstrated understanding of content through participation in and discussion of PROM experiential exercise.     Treatment Plan:  Patient has a current master individualized treatment plan.  See Epic treatment plan for more information.    Thad Donnelly, LPCC [facility]

## 2022-09-07 NOTE — GROUP NOTE
Psychoeducation Group Note    PATIENT'S NAME: Rafaela Mckeon  MRN:   [id number]  :   2000  ACCT. NUMBER: [id number]  DATE OF SERVICE: 22  START TIME: 11:00 AM  END TIME: 11:50 AM  FACILITATOR: Deanna Avila RN; Thad Donnelly  TOPIC: MH Wellness Group: Health Maintenance  [facility] Day Treatment  TRACK: 1A    NUMBER OF PARTICIPANTS: 6                                      Service Modality:  Video Visit     Telemedicine Visit: The patient's condition can be safely assessed and treated via synchronous audio and visual telemedicine encounter.      Reason for Telemedicine Visit: Services only offered telehealth    Originating Site (Patient Location): Patient's home    Distant Site (Provider Location): Provider Remote Setting- Home Office    Consent:  The patient/guardian has verbally consented to: the potential risks and benefits of telemedicine (video visit) versus in person care; bill my insurance or make self-payment for services provided; and responsibility for payment of non-covered services.     Patient would like the video invitation sent by:  My Chart    Mode of Communication:  Video Conference via Medical Zoom    As the provider I attest to compliance with applicable laws and regulations related to telemedicine.          Summary of Group / Topics Discussed:  Health Maintenance: Eight Dimensions of Wellness: The concept of holistic health through the model of eight dimensions was introduced. Group members participated in identifying behaviors and activities in each of the dimensions of wellness.  The importance of each dimension was reinforced and the concept of balance in life as it relates to wellness was explored.      Patient Session Goals / Objectives:    Verbalized understanding of balance in wellness and how it relates to their life    Identified and explained the eight dimensions of wellness    Categorized activities and wellness needs into corresponding  dimensions appropriately during exercise        Patient Participation / Response:  Fully participated with the group by sharing personal reflections / insights and openly received / provided feedback with other participants.    Demonstrated understanding of topics discussed through group discussion and participation    Treatment Plan:  Patient has a current master individualized treatment plan.  See Epic treatment plan for more information.    Thad Donnelly, LPCC, LADC

## 2022-09-07 NOTE — TELEPHONE ENCOUNTER
Called client and reviewed discharge instructions with safety plan.  Administered CSSR-S and her risk rating was moderate.  Reviewed safety plan.  Client is future oriented as she feels excited to start a new job tomorrow.

## 2022-09-07 NOTE — DISCHARGE SUMMARY
Adult Mental Health Intensive Outpatient Discharge Summary/Instructions      Patient: Rafaela Mckeon MRN: [id number]   : 2000 Age: 21    Admission Date: 22  Discharge Date: 10/31/22  Diagnosis: 295.70  (F25.1) Schizoaffective Disorder Depressive Type  309.81 (F43.10) Posttraumatic Stress Disorder (includes Posttraumatic Stress Disorder for Children 6 Years and Younger)  With dissociative symptoms.  300.02 (F41.1) Generalized Anxiety Disorder  300.3 (F42) Unspecified Obsessive Compulsive and Related Disorder  307.50 (F50.9) Unspecified Feeding or Eating Disorder.  History of Major Depression Disorder    Focus of Treatment / Progress    Personal Safety: Kit denies having any active concerns related to SI or SIB at discharge.     * Follow your safety plan     * Call crisis lines as needed:    [facility] [phone] [facility] [phone]  [facility] [phone] Crisis Connection [phone]  [facility] [phone] [facility] [phone]  [facility] [phone] National Suicide Prevention [phone]  [facility] [phone] Suicide Prevention [phone]  [facility] [phone]    Managing symptoms of:  Schizoaffective Disorder- depressed mood, psychosis, and anxiety    Community support/health:  [place] [address] ([phone]) [email or web address], [place] Crisis text Line (Text [place] to 519230) or call [facility]  [place], [place] ([phone])  [facility] [place], [place] ( [phone])  [facility] [address] [place], [place], [address] [phone]    Managing Symptoms and Preventing Relapse    * Go to all of your appointments    * Take all medications as directed.      * Carry a current list if medication with you    * Do not use illicit (street) drugs.  Avoid alcohol    * Report these symptoms to your care team. These are early signs of relapse:   Thoughts of  "suicide   Losing more sleep   Increased confusion   Mood getting worse   Feeling more aggressive   Other:  \"not taking care of self\" (not doing ADLs as usual)    *Use these skills daily:  Talk to someone you trust at least one time weekly, set boundaries and say \"no\", be assertive, act opposite of negative feelings, accept challenges with a positive attitude, exercise at least three times per week for 30 minutes,  get enough sleep, eat healthy foods, get into a good routine    Copy of summary sent to: In Epic My Chart    Follow up with psychiatrist / main caregiver: Ladarius Cooney at \"[facility]  Next visit: Please schedule if you do not have an appt already    Follow up with your therapist: Brittney Manuel with \"[facility]  Next visit: Please schedule if you do not have an appt already    Go to group therapy and / or support groups at: [facility] and [facility](DBSA) support groups    See your medical doctor about:  [facility]aga at [facility] for general wellness or illness as needed.    Other:  Your treatment team appreciates having the opportunity to work with you and wishes you the best.    Client Signature:_______________________  Date / Time:___________  Staff Signature:________   Date / Time:__9/7/2022_____      Outpatient Mental Health Services - Adult     MY COPING PLAN FOR SAFETY           Name: Rafaela Mckeon  YOB: 2000  Date: August 5, 2022   My primary care provider: Fartun Arriaga  [facility]  My primary care clinic:   My prescriber:  Ladarius Cooney.  Next appointment: in 2 weeks   Other care team support:  psychotherapy with Brittney Manuel with [facility]         What is important to me and makes life worth living: being with my family and my friends.            GREEN     Good Control  1. I feel good  2. No suicidal thoughts   3. Can work, sleep and play        Action Steps  1. Self-care: balanced meals, exercising, sleep " practices, etc.  2. Take your medications as prescribed.  3. Continue meetings with therapist and prescriber.  4.  Do the healthy things that I enjoy.                YELLOW  Getting Worse  I have ANY of these:  1. I do not feel good  2. Difficulty Concentrating  3. Sleep is changing  4. Increase/Change in my thoughts to hurt self and/or others, but I can still manage and not act on it.   5. Not taking care of self.                Action Steps (in addition to the above):  1. Inform your therapist and psychiatric prescriber/PCP.  2. Keep taking your medications as prescribed.    3. Turn to people you can ask for help.  4. Use internal coping strategies -see below.  5. Create safe environment: lock and limit medications, notify friends/family of increase in symptoms and reduce means to other identified method                RED  Get Help  If I have ANY of these:  1. Current and uncontrollable thoughts and/or behaviors to hurt self and/or others.        Actions to manage my safety  1. Contact your emergency person Monika Ho (mother) [phone]  2. Call my crisis team- [facility] [phone]  3. Or Call 911 or go to the emergency room right away            My Internal Coping Strategies include the following:  blow bubbles, belly breathing, arts and crafts, color, chew gum, fidget toys and use my coping skills     [Safety Plan Provided via [employer]]      Safety Concerns  How To Identify Situations That Make Your Mental Health Worse:  Triggers are things that make your mental health worse.  Look at the list below to help you find your triggers and what you can do about them.      1. Identify Early Warning Signs:     Sometimes symptoms return, even when people do their best to stay well. Symptoms can develop over a short period of time with little or no warning, but most of the time they emerge gradually over several weeks.  Early warning signs are changes that people experience when a relapse is starting.  Some early warning signs are common and others are not as common.   Common Early Warning Signs:    Feeling tense or nervous, Eating less or eating more, Trouble sleeping -either too much or too little sleep, Feeling depressed or low, Feeling irritable, Feeling like not being around other people, Trouble concentrating and Urges to harm self                 2. Identify action steps to take when warning signs are noticed:     Taking Action- It is important to take action if you are experiencing early warning signs of a relapse.  The faster you act, the more likely it is that you can avoid a full relapse.  It is helpful to identify several specific ways to cope with symptoms.       The following is my list of symptoms and coping strategies that I can use when they are present:     Symptom Coping Strategies   Anxiety -Talk with someone in your support system and let him or her know how you are feeling.  -Use relaxation techniques such as deep breathing or imagery.  -Use positive affirmations to counteract negative self-talk such as  I am learning to let go of worry.    Depression - Schedule your day; include activities you have to do and activities you enjoy doing.  - Get some exercise - walk, run, bike, or swim.  - Give yourself credit for even the smallest things you get done.   Sleep Difficulties    - Go to sleep at the same time every day.  - Do something relaxing before bed, such as drinking herbal tea or listening to music.  - Avoid having discussions about upsetting topics before going to bed.   Delusions    - Distract yourself from the disturbing thought by doing something that requires your attention such as a puzzle.  - Check out your beliefs by talking to someone you trust.    Hallucinations    - Use headphones to listen to music.  - Tell voices to  stop  or say to yourself,  I am safe.   - Ignore the hallucinations as much as possible; focus on other things.   Concentration Difficulties - Minimize  distractions so there is only one thing for you to focus on at a time.    - Ask the person you are having a conversation with to slow down or repeat things you are unsure of.

## 2022-09-08 NOTE — GROUP NOTE
Process Group Note    PATIENT'S NAME: Rafaela Mckeon  MRN:   [id number]  :   2000  ACCT. NUMBER: [id number]  DATE OF SERVICE: 22  START TIME:  9:00 AM  END TIME:  9:50 AM  FACILITATOR: Morena Otoole LICSW  TOPIC:  Process Group    Diagnoses:  295.70  (F25.1) Schizoaffective Disorder Depressive Type  309.81 (F43.10) Posttraumatic Stress Disorder (includes Posttraumatic Stress Disorder for Children 6 Years and Younger)  With dissociative symptoms.  4. Other Diagnoses that is relevant to services:   296.32 (F33.1) Major Depressive Disorder, Recurrent Episode, Moderate _  300.02 (F41.1) Generalized Anxiety Disorder  300.3 (F42) Unspecified Obsessive Compulsive and Related Disorder  307.50 (F50.9) Unspecified Feeding or Eating Disorder.    [facility] Day Treatment  TRACK: 1A    NUMBER OF PARTICIPANTS: 6                         Service Modality:  Video Visit     Telemedicine Visit: The patient's condition can be safely assessed and treated via synchronous audio and visual telemedicine encounter.      Reason for Telemedicine Visit: Services only offered telehealth    Originating Site (Patient Location): Patient's home    Distant Site (Provider Location): Provider Remote Setting- Home Office    Consent:  The patient/guardian has verbally consented to: the potential risks and benefits of telemedicine (video visit) versus in person care; bill my insurance or make self-payment for services provided; and responsibility for payment of non-covered services.     Patient would like the video invitation sent by:  My Chart    Mode of Communication:  Video Conference via Medical Zoom    As the provider I attest to compliance with applicable laws and regulations related to telemedicine.            Data:  Session content: At the start of this group, patients were invited to check in by identifying themselves, describing their current emotional status, and identifying issues to  "address in this group.   Area(s) of treatment focus addressed in this session included Symptom Management and Develop / Improve Independent Living Skills.    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 I wish she would tell me outright she doesn t want me to come back. People tell me I can t handle things because I was  too soft  think I can t handle harder stuff.   I applied for a different job- started over. Detailed schedule, every 15 minutes, won t give positive feedback. Goal- get chores done today, get meal prep clothes laid out, barriers: lack of motivation. OA skill. No safety, no cd, yes meds, grateful to parents for listening and turning my head around.   Yes, safety concerns, relapsed last night, yes SI, no CD, yes meds, no proud or grateful for, listening and feedback     Therapeutic Interventions/Treatment Strategies:  Psychotherapist offered support, feedback and validation and reinforced use of skills. Treatment modalities used include Cognitive Behavioral Therapy and Dialectical Behavioral Therapy. Interventions include Cognitive Restructuring:  Facilitated recognition of the connection between negative thoughts and negative core beliefs and Assisted patient in identifying new neutral/positive core beliefs and Coping Skills: Discussed use of self-soothe skills to decrease distress in the body, Assisted patient in identifying 1-2 healthy distraction skills to reduce overall distress and Discussed how the use of intentional \"in the moment\" actions can help reduce distress.    Assessment:     Patient response:   Patient responded to session by accepting feedback, giving feedback, listening, focusing on goals, being attentive and accepting support     Possible barriers to participation / learning include: and no barriers identified     Health Issues:              None reported                  Substance Use Review:             " " Substance Use: No active concerns identified.    Mental Status/Behavioral Observations  Appearance:   Appropriate   Eye Contact:   Good   Psychomotor Behavior: Normal   Attitude:   Cooperative  Pleasant  Orientation:   All  Speech   Rate / Production: Normal    Volume:  Normal   Mood:    Normal \"Excited\"  Affect:    Appropriate   Thought Content:   Clear  Thought Form:  Coherent  Logical     Insight:    Good     Plan:     Safety Plan: No current safety concerns identified.  Recommended that patient call 911 or go to the local ED should there be a change in any of these risk factors.     Barriers to treatment: None identified    Patient Contracts (see media tab):  None    Substance Use: Not addressed in session     Continue or Discharge: Patient is being discharged today. See Treatment Plan and Discharge Summary.       Morena Otoole, [facility]  September 8, 2022    "

## 2022-11-04 PROCEDURE — 96374 THER/PROPH/DIAG INJ IV PUSH: CPT

## 2022-11-04 PROCEDURE — 99284 EMERGENCY DEPT VISIT MOD MDM: CPT | Mod: 25

## 2022-11-04 PROCEDURE — 99284 EMERGENCY DEPT VISIT MOD MDM: CPT | Performed by: EMERGENCY MEDICINE

## 2022-11-04 PROCEDURE — 96375 TX/PRO/DX INJ NEW DRUG ADDON: CPT

## 2022-11-05 ENCOUNTER — HOSPITAL ENCOUNTER (EMERGENCY)
Facility: CLINIC | Age: 22
Discharge: HOME | End: 2022-11-05
Attending: EMERGENCY MEDICINE | Admitting: EMERGENCY MEDICINE
Payer: COMMERCIAL

## 2022-11-05 VITALS
BODY MASS INDEX: 21 KG/M2 | HEIGHT: 64 IN | OXYGEN SATURATION: 95 % | DIASTOLIC BLOOD PRESSURE: 81 MMHG | WEIGHT: 123 LBS | RESPIRATION RATE: 18 BRPM | TEMPERATURE: 97.8 F | HEART RATE: 101 BPM | SYSTOLIC BLOOD PRESSURE: 122 MMHG

## 2022-11-05 DIAGNOSIS — R10.11 RUQ ABDOMINAL PAIN: ICD-10-CM

## 2022-11-05 LAB
ALBUMIN SERPL BCG-MCNC: 3.8 G/DL (ref 3.5–5.2)
ALP SERPL-CCNC: 108 U/L (ref 35–104)
ALT SERPL W P-5'-P-CCNC: 58 U/L (ref 10–35)
ANION GAP SERPL CALCULATED.3IONS-SCNC: 12 MMOL/L (ref 7–15)
AST SERPL W P-5'-P-CCNC: 32 U/L (ref 10–35)
BASOPHILS # BLD AUTO: 0 10E3/UL (ref 0–0.2)
BASOPHILS NFR BLD AUTO: 0 %
BILIRUB SERPL-MCNC: <0.2 MG/DL
BUN SERPL-MCNC: 9.8 MG/DL (ref 6–20)
CALCIUM SERPL-MCNC: 9 MG/DL (ref 8.6–10)
CHLORIDE SERPL-SCNC: 103 MMOL/L (ref 98–107)
CREAT SERPL-MCNC: 0.6 MG/DL (ref 0.51–0.95)
DEPRECATED HCO3 PLAS-SCNC: 24 MMOL/L (ref 22–29)
EOSINOPHIL # BLD AUTO: 0.2 10E3/UL (ref 0–0.7)
EOSINOPHIL NFR BLD AUTO: 1 %
ERYTHROCYTE [DISTWIDTH] IN BLOOD BY AUTOMATED COUNT: 11.9 % (ref 10–15)
GFR SERPL CREATININE-BSD FRML MDRD: >90 ML/MIN/1.73M2
GLUCOSE SERPL-MCNC: 123 MG/DL (ref 70–99)
HCT VFR BLD AUTO: 35.9 % (ref 35–47)
HGB BLD-MCNC: 11.6 G/DL (ref 11.7–15.7)
HOLD SPECIMEN: NORMAL
HOLD SPECIMEN: NORMAL
IMM GRANULOCYTES # BLD: 0 10E3/UL
IMM GRANULOCYTES NFR BLD: 0 %
LIPASE SERPL-CCNC: 46 U/L (ref 13–60)
LYMPHOCYTES # BLD AUTO: 1.6 10E3/UL (ref 0.8–5.3)
LYMPHOCYTES NFR BLD AUTO: 14 %
MCH RBC QN AUTO: 31.8 PG (ref 26.5–33)
MCHC RBC AUTO-ENTMCNC: 32.3 G/DL (ref 31.5–36.5)
MCV RBC AUTO: 98 FL (ref 78–100)
MONOCYTES # BLD AUTO: 1 10E3/UL (ref 0–1.3)
MONOCYTES NFR BLD AUTO: 9 %
NEUTROPHILS # BLD AUTO: 8.6 10E3/UL (ref 1.6–8.3)
NEUTROPHILS NFR BLD AUTO: 76 %
NRBC # BLD AUTO: 0 10E3/UL
NRBC BLD AUTO-RTO: 0 /100
PLATELET # BLD AUTO: 299 10E3/UL (ref 150–450)
POTASSIUM SERPL-SCNC: 3.7 MMOL/L (ref 3.4–5.3)
PROT SERPL-MCNC: 6.5 G/DL (ref 6.4–8.3)
RBC # BLD AUTO: 3.65 10E6/UL (ref 3.8–5.2)
SODIUM SERPL-SCNC: 139 MMOL/L (ref 136–145)
WBC # BLD AUTO: 11.4 10E3/UL (ref 4–11)

## 2022-11-05 PROCEDURE — 36415 COLL VENOUS BLD VENIPUNCTURE: CPT | Performed by: EMERGENCY MEDICINE

## 2022-11-05 PROCEDURE — 82040 ASSAY OF SERUM ALBUMIN: CPT | Performed by: EMERGENCY MEDICINE

## 2022-11-05 PROCEDURE — 250N000013 HC RX MED GY IP 250 OP 250 PS 637: Performed by: EMERGENCY MEDICINE

## 2022-11-05 PROCEDURE — 250N000011 HC RX IP 250 OP 636: Performed by: EMERGENCY MEDICINE

## 2022-11-05 PROCEDURE — 80053 COMPREHEN METABOLIC PANEL: CPT | Performed by: EMERGENCY MEDICINE

## 2022-11-05 PROCEDURE — C9113 INJ PANTOPRAZOLE SODIUM, VIA: HCPCS | Performed by: EMERGENCY MEDICINE

## 2022-11-05 PROCEDURE — 83690 ASSAY OF LIPASE: CPT | Performed by: EMERGENCY MEDICINE

## 2022-11-05 PROCEDURE — 85018 HEMOGLOBIN: CPT | Performed by: EMERGENCY MEDICINE

## 2022-11-05 RX ORDER — SUCRALFATE ORAL 1 G/10ML
1 SUSPENSION ORAL ONCE
Status: COMPLETED | OUTPATIENT
Start: 2022-11-05 | End: 2022-11-05

## 2022-11-05 RX ORDER — SUCRALFATE ORAL 1 G/10ML
1 SUSPENSION ORAL 4 TIMES DAILY
Qty: 414 ML | Refills: 0 | Status: SHIPPED | OUTPATIENT
Start: 2022-11-05 | End: 2022-11-15

## 2022-11-05 RX ORDER — ONDANSETRON 2 MG/ML
4 INJECTION INTRAMUSCULAR; INTRAVENOUS ONCE
Status: COMPLETED | OUTPATIENT
Start: 2022-11-05 | End: 2022-11-05

## 2022-11-05 RX ADMIN — PANTOPRAZOLE SODIUM 40 MG: 40 INJECTION, POWDER, FOR SOLUTION INTRAVENOUS at 02:57

## 2022-11-05 RX ADMIN — ONDANSETRON 4 MG: 2 INJECTION INTRAMUSCULAR; INTRAVENOUS at 00:59

## 2022-11-05 RX ADMIN — SUCRALFATE 1 G: 1 SUSPENSION ORAL at 02:58

## 2022-11-05 ASSESSMENT — ENCOUNTER SYMPTOMS
COUGH: 0
DYSURIA: 0
HEADACHES: 0
BACK PAIN: 0
CHEST TIGHTNESS: 0
ABDOMINAL PAIN: 1
DIAPHORESIS: 0
APPETITE CHANGE: 1
NAUSEA: 1
CONSTIPATION: 0
FEVER: 0
DYSPHORIC MOOD: 0
LIGHT-HEADEDNESS: 0
SHORTNESS OF BREATH: 0
FATIGUE: 0
ABDOMINAL DISTENTION: 1
DIARRHEA: 0
VOMITING: 1
BLOOD IN STOOL: 0

## 2022-11-05 ASSESSMENT — ACTIVITIES OF DAILY LIVING (ADL)
ADLS_ACUITY_SCORE: 35
ADLS_ACUITY_SCORE: 35

## 2022-11-05 NOTE — ED TRIAGE NOTES
Pt complains of right upper quadrant cramping/stabbing pain. Pt states she has had a gastric ulcer in the past and this feels like that. Pt reports nausea and vomiting. Emesis x2. Pt denies diarrhea.      Triage Assessment     Row Name 11/05/22 0033       Triage Assessment (Adult)    Airway WDL WDL       Respiratory WDL    Respiratory WDL WDL       Skin Circulation/Temperature WDL    Skin Circulation/Temperature WDL WDL       Cardiac WDL    Cardiac WDL WDL       Peripheral/Neurovascular WDL    Peripheral Neurovascular WDL WDL       Cognitive/Neuro/Behavioral WDL    Cognitive/Neuro/Behavioral WDL WDL

## 2022-11-05 NOTE — ED PROVIDER NOTES
History     Chief Complaint   Patient presents with     Abdominal Pain     Pt complains of right upper quadrant cramping/stabbing pain. Pt states she has had a gastric ulcer in the past and this feels like that. Pt reports nausea and vomiting. Emesis x2. Pt denies diarrhea.      HPI  Rafaela Mckeon is a 22 year old female with a history of gastric ulcer and previous cholecystectomy in the past presenting for evaluation of right quadrant abdominal pain.  Symptoms began several days ago with pain in right upper abdomen.  Pain is sharp and pressure-like.  She reports it is similar to when she had an ulcer in the past.  She is on chronic omeprazole therapy and has been taking this daily.  Followed up in clinic on Wednesday for this pain and was scheduled for an endoscopy at the end of the month.  Patient reports of nausea and has reportedly thrown up twice.  Has been having normal bowel movements.  Denies fevers or chills.  Patient reports she tried some tramadol for the pain but this did not seem to help.  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     Priority: Medium     Severe recurrent major depression without psychotic features (H) 03/02/2019     Priority: Medium     Abnormal hearing screen 10/09/2018     Priority: Medium     Suicidal ideation 02/02/2018     Priority: Medium     Generalized anxiety disorder 09/28/2017     Priority: Medium        Past Medical History:    Past Medical  History:   Diagnosis Date     Foreign-Danlos syndrome      Postural orthostatic tachycardia syndrome        Past Surgical History:    Past Surgical History:   Procedure Laterality Date     CHOLECYSTECTOMY         Family History:    Family History   Problem Relation Age of Onset     Depression Mother      Allergies Mother         Seasonal     Depression Father      Anxiety Disorder Father      Anxiety Disorder Maternal Grandmother      Depression Maternal Grandmother      Depression Sister      Allergies Sister         Seasonal     Schizophrenia Maternal Uncle        Social History:  Marital Status:  Single [1]  Social History     Tobacco Use     Smoking status: Never     Smokeless tobacco: Never   Substance Use Topics     Alcohol use: No     Drug use: No        Medications:    sucralfate (CARAFATE) 1 GM/10ML suspension  albuterol (PROAIR HFA/PROVENTIL HFA/VENTOLIN HFA) 108 (90 Base) MCG/ACT inhaler  benzoyl peroxide 5 % external liquid  ciclopirox (LOPROX) 0.77 % cream  clonazePAM (KLONOPIN) 0.5 MG tablet  doxycycline hyclate (VIBRAMYCIN) 50 MG capsule  etonogestrel (NEXPLANON) 68 MG IMPL  fluocinonide (LIDEX) 0.05 % external solution  FLUoxetine (PROZAC) 10 MG capsule  gabapentin (NEURONTIN) 300 MG capsule  hydrOXYzine (VISTARIL) 25 MG capsule  lamoTRIgine (LAMICTAL) 100 MG tablet  mirtazapine (REMERON) 15 MG tablet  prazosin (MINIPRESS) 5 MG capsule  QUEtiapine (SEROQUEL) 50 MG tablet  QUEtiapine ER (SEROQUEL XR) 400 MG 24 hr tablet  tretinoin (RETIN-A) 0.05 % external cream          Review of Systems   Constitutional: Positive for appetite change (Decreased). Negative for diaphoresis, fatigue and fever.   HENT: Negative for congestion.    Respiratory: Negative for cough, chest tightness and shortness of breath.    Cardiovascular: Negative for chest pain.   Gastrointestinal: Positive for abdominal distention, abdominal pain, nausea and vomiting. Negative for blood in stool, constipation and diarrhea.  "  Genitourinary: Negative for decreased urine volume and dysuria.   Musculoskeletal: Negative for back pain.   Skin: Negative for rash.   Neurological: Negative for light-headedness and headaches.   Psychiatric/Behavioral: Negative for dysphoric mood.   All other systems reviewed and are negative.      Physical Exam   BP: 122/81  Pulse: 101  Temp: 97.8  F (36.6  C)  Resp: 18  Height: 162.6 cm (5' 4\")  Weight: 55.8 kg (123 lb)  SpO2: 97 %      Physical Exam  Vitals and nursing note reviewed.   Constitutional:       Appearance: She is well-developed. She is not ill-appearing or diaphoretic.      Comments: Uncomfortable appearing but awake and alert in no distress.  Complaining of pain in the right upper abdomen, holding her right upper abdomen.  Able to provide a full story and speak in full sentences.   HENT:      Mouth/Throat:      Mouth: Mucous membranes are moist.   Cardiovascular:      Rate and Rhythm: Normal rate.   Pulmonary:      Effort: Pulmonary effort is normal.   Abdominal:      General: Abdomen is flat. Bowel sounds are normal.      Palpations: Abdomen is soft.      Tenderness: There is abdominal tenderness in the right upper quadrant.   Skin:     General: Skin is warm and dry.      Capillary Refill: Capillary refill takes less than 2 seconds.   Neurological:      Mental Status: She is alert and oriented to person, place, and time.   Psychiatric:         Mood and Affect: Mood normal.         ED Course            Procedures                Results for orders placed or performed during the hospital encounter of 11/05/22 (from the past 24 hour(s))   CBC with platelets, differential    Narrative    The following orders were created for panel order CBC with platelets, differential.  Procedure                               Abnormality         Status                     ---------                               -----------         ------                     CBC with platelets and d...[[id number]]  Abnormal           "  Final result                 Please view results for these tests on the individual orders.   Comprehensive metabolic panel   Result Value Ref Range    Sodium 139 136 - 145 mmol/L    Potassium 3.7 3.4 - 5.3 mmol/L    Chloride 103 98 - 107 mmol/L    Carbon Dioxide (CO2) 24 22 - 29 mmol/L    Anion Gap 12 7 - 15 mmol/L    Urea Nitrogen 9.8 6.0 - 20.0 mg/dL    Creatinine 0.60 0.51 - 0.95 mg/dL    Calcium 9.0 8.6 - 10.0 mg/dL    Glucose 123 (H) 70 - 99 mg/dL    Alkaline Phosphatase 108 (H) 35 - 104 U/L    AST 32 10 - 35 U/L    ALT 58 (H) 10 - 35 U/L    Protein Total 6.5 6.4 - 8.3 g/dL    Albumin 3.8 3.5 - 5.2 g/dL    Bilirubin Total <0.2 <=1.2 mg/dL    GFR Estimate >90 >60 mL/min/1.73m2   [place] Draw    Narrative    The following orders were created for panel order [place] Draw.  Procedure                               Abnormality         Status                     ---------                               -----------         ------                     Extra Blue Top Tube[[id number]]                              Final result               Extra Red Top Tube[[id number]]                               Final result                 Please view results for these tests on the individual orders.   CBC with platelets and differential   Result Value Ref Range    WBC Count 11.4 (H) 4.0 - 11.0 10e3/uL    RBC Count 3.65 (L) 3.80 - 5.20 10e6/uL    Hemoglobin 11.6 (L) 11.7 - 15.7 g/dL    Hematocrit 35.9 35.0 - 47.0 %    MCV 98 78 - 100 fL    MCH 31.8 26.5 - 33.0 pg    MCHC 32.3 31.5 - 36.5 g/dL    RDW 11.9 10.0 - 15.0 %    Platelet Count 299 150 - 450 10e3/uL    % Neutrophils 76 %    % Lymphocytes 14 %    % Monocytes 9 %    % Eosinophils 1 %    % Basophils 0 %    % Immature Granulocytes 0 %    NRBCs per 100 WBC 0 <1 /100    Absolute Neutrophils 8.6 (H) 1.6 - 8.3 10e3/uL    Absolute Lymphocytes 1.6 0.8 - 5.3 10e3/uL    Absolute Monocytes 1.0 0.0 - 1.3 10e3/uL    Absolute Eosinophils 0.2 0.0 - 0.7 10e3/uL    Absolute Basophils 0.0 0.0 - 0.2  10e3/uL    Absolute Immature Granulocytes 0.0 <=0.4 10e3/uL    Absolute NRBCs 0.0 10e3/uL   Extra Blue Top Tube   Result Value Ref Range    Hold Specimen JIC    Extra Red Top Tube   Result Value Ref Range    Hold Specimen JIC    Lipase   Result Value Ref Range    Lipase 46 13 - 60 U/L       Medications   ondansetron (ZOFRAN) injection 4 mg (4 mg Intravenous Given 11/5/22 0059)   pantoprazole (PROTONIX) IV push injection 40 mg (40 mg Intravenous Given 11/5/22 0257)   sucralfate (CARAFATE) suspension 1 g (1 g Oral Given 11/5/22 0258)     4:24 AM Patient re-assessed: Patient feeling much better after meds.  Still with some mild pain rated 4/10.  Still with some mild localized right upper quadrant tenderness.  No tenderness in the rest of the abdomen to suggest peritonitis.  Labs showed only mild white count with normal lipase, minimally elevated alk phos and ALT.  Patient feels better and wants to go home.  I did discuss consideration for CT imaging to further evaluate the cause of her pain but she feels her symptoms are adequately improved and managed and before going home to rest.    Assessments & Plan (with Medical Decision Making)  22-year-old female with history of gastritis and gastric ulcers in the past presenting for evaluation of right upper quadrant abdominal pain similar to previous gastritis she has had.  Well-appearing in no distress.  Treated with Zofran, pantoprazole, and sulcal fate with improvement in her symptoms.  Labs largely reassuring.  She did have a slight white count and slight bump of LFTs which is nonspecific.  Patient counseled that if symptoms not improving or if they worsen, to return for repeat evaluation     I have reviewed the nursing notes.    I have reviewed the findings, diagnosis, plan and need for follow up with the patient.       New Prescriptions    SUCRALFATE (CARAFATE) 1 GM/10ML SUSPENSION    Take 10 mLs (1 g) by mouth 4 times daily for 10 days       Final diagnoses:   RUQ  abdominal pain - likely gastritis vs ulcer       11/4/2022   [facility] EMERGENCY DEPT     Blair, Pio García MD  11/05/22 0884

## 2022-11-20 ENCOUNTER — HEALTH MAINTENANCE LETTER (OUTPATIENT)
Age: 22
End: 2022-11-20

## 2023-01-11 ENCOUNTER — TELEPHONE (OUTPATIENT)
Dept: TRANSPLANT | Facility: CLINIC | Age: 23
End: 2023-01-11

## 2023-01-11 ENCOUNTER — DOCUMENTATION ONLY (OUTPATIENT)
Dept: TRANSPLANT | Facility: CLINIC | Age: 23
End: 2023-01-11

## 2023-01-11 NOTE — TELEPHONE ENCOUNTER
"Donor Intake Start: 23 Donor Intake Complete: 23  Gender: Female Preferred Language: English  Full Name: Rafaela Mckeon Is  Needed: [not answered]  E-mail: [email or web address] Phone Number: [phone]  Secondary Phone:  Contact Preference: [not answered] Best Contact Time: 10am - 5pm  Emergency Contact: Monika Smallwoodleandergabriel Emergency Contact #: [phone]  Relationship to Contact: Contact is my parent  : 01 Age: 22  Address: [address] [facility] [place]: [facility]  State: [place] Postal Code: [id number]  Height: 5'4\" Weight: 125lbs  BMI: 21.5  Employment Status: Student Has PTO for donation? [not answered]  Occupation: [not answered] Requires Heavy Lifting? [not answered]  Education Level: High School Marital Status: Single  Exercise Routine: 2-3/Week Health Insurance: Yes  Blood Type: O Ethnicity/Race: White  Donor Type: Standard Voucher Donor  Prefer Remote Donation: [not answered]  Physician: Fartun Arriaga<br/>[place], [place]  Donating for Local Recipient  Recipient's Name: Pio Miller Recipient's : Doesn't know  Recipient's Status: Patient on dialysis. How Candidate Knows Recip: Social Media  Candidate communicates w/  Recip: Less Than Once A Month  Possible Interest In:  Motivation to donate: Seeing how hard it was for my cousin, who has kidney disease, to be stuck waiting and hoping  for so long. I want to help someone get unstuck and improve their quality of life.  Living Donor Pre-Screening  Is In [place]  Will accept blood transfusions? Yes  Has been diagnosed with kidney disease? No  Has had a heart attack? No  Has Diabetes (High BGs)? No  Has had cancer? No  Has had kidney stones? No  Has ever been pregnant? No  Is Planning on Pregnancy? No  Is Taking Birth Control? Yes   - Birth Control Months? 30   - BirthControlForm? Nexplanon   - Birth Control Complications? No   - Is Able To Stop Birth Control? Yes  Has Used Tobacco? No  Has HIV? No  Is Currently Incarcerated? " No  Is Currently Residing in [place] Yes  History Misc  Has Allergies? Yes  Allergy  Sulfa drugs  Has had Surgeries? Yes  Surgery When  Gallbladder removal Dec 2019  Takes Medication? Yes  Medication Dose Frequency  Gabapentin 300mg 2x daily  Prozac 80mg Once daily  Lamotrigine 100mg Once daily  Seroquel 400mg Once daily  Prazosin 5mg Once daily  Omeprazole 40mg 2x daily  Medical History  History of High BP? Never  History of CABG (bypass surgery)? No  History of blood clots? Never  History of coronary disease? Never  History of high cholesterol or triglycerides? Never  Has stents implanted? No  History of chest pain during exercise? No  History of chest pain at other times? No  Results of climbing 2 flights of stairs? No Problem  Had stress test in last year? No  Has had stroke? No  Has had leg bypass? No  History of lung disease? Never  History of COPD? Never  History of TB? Never  History of Pneumonia? Never  Has respiratory issues? Yes   - respiratory issues? Asthma  Has HIV? No  Has Gastro Issues? Yes   - gastro issues? Reflux  History of Gallstones? Treated in past  History of Pancreatitis? Never  History of Liver Disease? Never  History of Hepatitis B? Never  History of Hepatitis C? Never  History of bleeding problems? Never  History of UTIs? No  History of kidney damage? Never  History of Proteinuria? Never  History of Hematuria? Never  History of neuro disease? Never  History of seizure? Never  History of lupus? Never  History of paralysis? Never  History of arthritis? Never  History of neuropathy? Never  History of depression? Treated in past  History of anxiety? Treated in past  History of documented psychiatric illness? Treated in past   - description:  Previous mental health crisis  when I was younger that has  been treated and is well  controlled and currently not an  issue  History of Fibroid Uterus? Never  History of Endometriosis? Never  History of Polycystic Ovaries? Never  Has had Miscarriages?  No  Has had abortions? No  Has had transfusions? No  History of Obesity? No  History of Fabry's Disease? No  History of Sickle Cell Disease? No  History of Sickle Cell Trait? No  History of Sarcoidosis? No  Has auto-immune disease? No  Has had Physical Exam? Yes   - how many years ago: 2  Has had Mammogram? No  Has had Pap Smear? No  Colonoscopy? Yes   - how many years ago: 1  Medical history comments? [no comments]  Living Donor Family Medical History  Anyone with kidney disease? No  Anyone with liver disease? No  Anyone with heart disease? No  Anyone with coronary artery disease? No  Anyone with high blood pressure? No  Anyone with blood disorder? No  Anyone with cancer? No  Anyone with kidney cancer? No  Anyone with diabetes? No  Is mother alive? Yes  Mother's age? 50  Is father alive? Yes  Father's age? 59  How many siblings? 2  How many adult children? 0  How many children under 18? 0  Social History  Has Used Alcohol? No  Has Used Drugs? No  Has had legal issues w/ law enforcement? No  Traveled over 100 miles from home in last year? No  Has had suicidal thoughts or attempts in the last five years? No

## 2023-01-16 ENCOUNTER — TELEPHONE (OUTPATIENT)
Dept: TRANSPLANT | Facility: CLINIC | Age: 23
End: 2023-01-16

## 2023-01-16 ENCOUNTER — DOCUMENTATION ONLY (OUTPATIENT)
Dept: TRANSPLANT | Facility: CLINIC | Age: 23
End: 2023-01-16

## 2023-01-16 NOTE — PROGRESS NOTES
Spoke to Kit about being a backup donor. She is willing to do that and will reach out with any further questions or concerns.

## 2023-01-16 NOTE — TELEPHONE ENCOUNTER
Initial Independent Living Donor Advocate contact made with potential donor today.  I introduced myself and my role during the donation process, includin.  MANDA ROLE   The federal government requires that all licensed transplant centers provide the living donor with an Independent Living Donor Advocate (MANDA).  I do not meet recipients or attend meetings that discuss their care or decision to transplant them. My role is separate to avoid any conflict of interest.  My role is to ensure:  1) your rights are protected;  2) you get all the information you need from the transplant team to make a fully informed decision whether to donate;   3) that living donation is in your best interest.   4) that you have the right to decide NOT to go forward with living donation at any time during this process.  I am available to you throughout the workup, during surgery phase and follow-up at home.     2. WORKUP & PRIVACY     Your identity and workup are not shared with the recipient at any time.     The recipient's insurance covers the medical expenses related to the donor evaluation and surgery.  However, it is important that you carry your own health insurance to address any medical issues that are found and are NOT related to living donation.  Additionally, age appropriate cancer screening (I.e. mammograms,  colonoscopies, etc) is required and would be through your insurance.    There is a psychosocial and medical donor workup that consists of testing to determine if you are healthy enough to donate. Workup tests include tissue typing/genetics, many blood draws, urine collection/ (kidney function testing), chest x-ray, EKG/other heart testing, CT scan. Age appropriate cancer screening is required and would be through your insurance. As you complete each step then you may move on to the next.  Workup can take as little or as long as you need and you can stop the process at any time. Transplant is a treatment option, not a  cure. A kidney from a living kidney donor can last 12-14 years.  Other treatment options are  donation and two types of dialysis.     This is major surgery and your estimated hospital stay is approximately 1-2 nights.  After surgery, there are driving and lifting restrictions - no driving for two weeks and no lifting over ten pounds for 8 weeks.  Donors are routinely off from work for 4 - 6 weeks after surgery, and potentially longer if they have a physical job.       If you anticipate lost wages due to donation, donor wage reimbursement options may be available to you and will be reviewed with you during the evaluation process. Donor Shield and NLDAC explained.    We reviewed the importance of completing follow-up labs and surveys at six months, 1 year and 2 years after donation to monitor kidney health and the impact donation has had on their life post donation.       3.  QUESTIONS    Have you received the Welcome e-mail that includes copies of the informed consent, financial letter, information on donor shield and NLDAC from the transplant department? Yes. Questions? No.    Have you discussed with anyone your potential decision to donate?   Yes.    Is anyone pressuring or coercing you to donate? No.    Have you discussed any financial arrangements with recipient around donating a kidney? No.    Are you aware that you can confidentially opt out at any time, up to and including day of donation? Yes.    At this time, would you like to proceed with the medical evaluation to see if you can be a kidney donor?  Yes.    If yes, I will make an appointment for your donor coordinator to reach out to you with next steps.     Contact information for MANDA's was provided Yes.    Ruchi Du- [phone]  Cassy Celis- [phone]      Time frame for donation: open  Paired exchange was introduced  No.      MyChart was initiated  Yes.  CareEverywhere was initiated No.    MANDA NOTES: Rafaela wants to donate to a friend of a  friend.  She knows his  brother.  Rafaela met him online.  She and Osiris Bradford are scheduled next Monday, Jan. 23 at 2:30 pm      Duration of call 30 minutes

## 2023-07-31 ENCOUNTER — HOSPITAL ENCOUNTER (EMERGENCY)
Facility: CLINIC | Age: 23
Discharge: HOME | End: 2023-07-31
Attending: FAMILY MEDICINE | Admitting: FAMILY MEDICINE
Payer: COMMERCIAL

## 2023-07-31 ENCOUNTER — APPOINTMENT (OUTPATIENT)
Dept: CT IMAGING | Facility: CLINIC | Age: 23
End: 2023-07-31
Attending: FAMILY MEDICINE
Payer: COMMERCIAL

## 2023-07-31 VITALS
WEIGHT: 123 LBS | TEMPERATURE: 97.8 F | BODY MASS INDEX: 21 KG/M2 | SYSTOLIC BLOOD PRESSURE: 130 MMHG | DIASTOLIC BLOOD PRESSURE: 73 MMHG | HEIGHT: 64 IN | RESPIRATION RATE: 16 BRPM | OXYGEN SATURATION: 97 % | HEART RATE: 75 BPM

## 2023-07-31 DIAGNOSIS — R10.84 GENERALIZED ABDOMINAL PAIN: ICD-10-CM

## 2023-07-31 DIAGNOSIS — R11.2 NAUSEA AND VOMITING, UNSPECIFIED VOMITING TYPE: ICD-10-CM

## 2023-07-31 LAB
ALBUMIN SERPL BCG-MCNC: 4.7 G/DL (ref 3.5–5.2)
ALBUMIN UR-MCNC: NEGATIVE MG/DL
ALP SERPL-CCNC: 89 U/L (ref 35–104)
ALT SERPL W P-5'-P-CCNC: 48 U/L (ref 0–50)
ANION GAP SERPL CALCULATED.3IONS-SCNC: 15 MMOL/L (ref 7–15)
APPEARANCE UR: CLEAR
AST SERPL W P-5'-P-CCNC: 43 U/L (ref 0–45)
BASOPHILS # BLD AUTO: 0 10E3/UL (ref 0–0.2)
BASOPHILS NFR BLD AUTO: 0 %
BILIRUB SERPL-MCNC: 0.3 MG/DL
BILIRUB UR QL STRIP: NEGATIVE
BUN SERPL-MCNC: 10.7 MG/DL (ref 6–20)
CALCIUM SERPL-MCNC: 10.1 MG/DL (ref 8.6–10)
CHLORIDE SERPL-SCNC: 102 MMOL/L (ref 98–107)
COLOR UR AUTO: YELLOW
CREAT SERPL-MCNC: 0.63 MG/DL (ref 0.51–0.95)
DEPRECATED HCO3 PLAS-SCNC: 22 MMOL/L (ref 22–29)
EOSINOPHIL # BLD AUTO: 0.1 10E3/UL (ref 0–0.7)
EOSINOPHIL NFR BLD AUTO: 0 %
ERYTHROCYTE [DISTWIDTH] IN BLOOD BY AUTOMATED COUNT: 13.3 % (ref 10–15)
GFR SERPL CREATININE-BSD FRML MDRD: >90 ML/MIN/1.73M2
GLUCOSE SERPL-MCNC: 109 MG/DL (ref 70–99)
GLUCOSE UR STRIP-MCNC: NEGATIVE MG/DL
HCG UR QL: NEGATIVE
HCT VFR BLD AUTO: 40.4 % (ref 35–47)
HGB BLD-MCNC: 13.1 G/DL (ref 11.7–15.7)
HGB UR QL STRIP: ABNORMAL
HOLD SPECIMEN: NORMAL
IMM GRANULOCYTES # BLD: 0 10E3/UL
IMM GRANULOCYTES NFR BLD: 0 %
KETONES UR STRIP-MCNC: 20 MG/DL
LEUKOCYTE ESTERASE UR QL STRIP: NEGATIVE
LYMPHOCYTES # BLD AUTO: 0.6 10E3/UL (ref 0.8–5.3)
LYMPHOCYTES NFR BLD AUTO: 5 %
MCH RBC QN AUTO: 29.5 PG (ref 26.5–33)
MCHC RBC AUTO-ENTMCNC: 32.4 G/DL (ref 31.5–36.5)
MCV RBC AUTO: 91 FL (ref 78–100)
MONOCYTES # BLD AUTO: 0.8 10E3/UL (ref 0–1.3)
MONOCYTES NFR BLD AUTO: 6 %
NEUTROPHILS # BLD AUTO: 11.1 10E3/UL (ref 1.6–8.3)
NEUTROPHILS NFR BLD AUTO: 89 %
NITRATE UR QL: NEGATIVE
NRBC # BLD AUTO: 0 10E3/UL
NRBC BLD AUTO-RTO: 0 /100
PH UR STRIP: 8 [PH] (ref 5–7)
PLATELET # BLD AUTO: 360 10E3/UL (ref 150–450)
POTASSIUM SERPL-SCNC: 3.7 MMOL/L (ref 3.4–5.3)
PROT SERPL-MCNC: 7.6 G/DL (ref 6.4–8.3)
RBC # BLD AUTO: 4.44 10E6/UL (ref 3.8–5.2)
RBC URINE: 1 /HPF
SODIUM SERPL-SCNC: 139 MMOL/L (ref 136–145)
SP GR UR STRIP: 1.01 (ref 1–1.03)
SQUAMOUS EPITHELIAL: <1 /HPF
UROBILINOGEN UR STRIP-MCNC: NORMAL MG/DL
WBC # BLD AUTO: 12.5 10E3/UL (ref 4–11)
WBC URINE: 1 /HPF

## 2023-07-31 PROCEDURE — 74177 CT ABD & PELVIS W/CONTRAST: CPT

## 2023-07-31 PROCEDURE — 258N000003 HC RX IP 258 OP 636: Performed by: FAMILY MEDICINE

## 2023-07-31 PROCEDURE — 96374 THER/PROPH/DIAG INJ IV PUSH: CPT | Mod: 59 | Performed by: FAMILY MEDICINE

## 2023-07-31 PROCEDURE — 82310 ASSAY OF CALCIUM: CPT | Performed by: FAMILY MEDICINE

## 2023-07-31 PROCEDURE — 250N000011 HC RX IP 250 OP 636: Mod: JZ | Performed by: FAMILY MEDICINE

## 2023-07-31 PROCEDURE — 99284 EMERGENCY DEPT VISIT MOD MDM: CPT | Performed by: FAMILY MEDICINE

## 2023-07-31 PROCEDURE — 81001 URINALYSIS AUTO W/SCOPE: CPT | Performed by: FAMILY MEDICINE

## 2023-07-31 PROCEDURE — 96375 TX/PRO/DX INJ NEW DRUG ADDON: CPT | Performed by: FAMILY MEDICINE

## 2023-07-31 PROCEDURE — 85025 COMPLETE CBC W/AUTO DIFF WBC: CPT | Performed by: FAMILY MEDICINE

## 2023-07-31 PROCEDURE — 36415 COLL VENOUS BLD VENIPUNCTURE: CPT | Performed by: FAMILY MEDICINE

## 2023-07-31 PROCEDURE — 96361 HYDRATE IV INFUSION ADD-ON: CPT | Performed by: FAMILY MEDICINE

## 2023-07-31 PROCEDURE — 250N000011 HC RX IP 250 OP 636: Performed by: FAMILY MEDICINE

## 2023-07-31 PROCEDURE — 99285 EMERGENCY DEPT VISIT HI MDM: CPT | Mod: 25 | Performed by: FAMILY MEDICINE

## 2023-07-31 PROCEDURE — 81025 URINE PREGNANCY TEST: CPT | Performed by: FAMILY MEDICINE

## 2023-07-31 PROCEDURE — 96376 TX/PRO/DX INJ SAME DRUG ADON: CPT | Performed by: FAMILY MEDICINE

## 2023-07-31 PROCEDURE — 250N000009 HC RX 250: Performed by: FAMILY MEDICINE

## 2023-07-31 RX ORDER — LORAZEPAM 2 MG/ML
1 INJECTION INTRAMUSCULAR ONCE
Status: COMPLETED | OUTPATIENT
Start: 2023-07-31 | End: 2023-07-31

## 2023-07-31 RX ORDER — IOPAMIDOL 755 MG/ML
59 INJECTION, SOLUTION INTRAVASCULAR ONCE
Status: COMPLETED | OUTPATIENT
Start: 2023-07-31 | End: 2023-07-31

## 2023-07-31 RX ORDER — METOCLOPRAMIDE HYDROCHLORIDE 5 MG/ML
10 INJECTION INTRAMUSCULAR; INTRAVENOUS ONCE
Status: DISCONTINUED | OUTPATIENT
Start: 2023-07-31 | End: 2023-07-31

## 2023-07-31 RX ORDER — KETOROLAC TROMETHAMINE 15 MG/ML
10 INJECTION, SOLUTION INTRAMUSCULAR; INTRAVENOUS ONCE
Status: COMPLETED | OUTPATIENT
Start: 2023-07-31 | End: 2023-07-31

## 2023-07-31 RX ORDER — METOCLOPRAMIDE HYDROCHLORIDE 5 MG/ML
5 INJECTION INTRAMUSCULAR; INTRAVENOUS ONCE
Status: COMPLETED | OUTPATIENT
Start: 2023-07-31 | End: 2023-07-31

## 2023-07-31 RX ORDER — ONDANSETRON 4 MG/1
4 TABLET, ORALLY DISINTEGRATING ORAL EVERY 6 HOURS PRN
Qty: 6 TABLET | Refills: 0 | Status: SHIPPED | OUTPATIENT
Start: 2023-07-31 | End: 2024-04-08

## 2023-07-31 RX ORDER — ONDANSETRON 2 MG/ML
4 INJECTION INTRAMUSCULAR; INTRAVENOUS EVERY 30 MIN PRN
Status: DISCONTINUED | OUTPATIENT
Start: 2023-07-31 | End: 2023-07-31 | Stop reason: HOSPADM

## 2023-07-31 RX ADMIN — METOCLOPRAMIDE HYDROCHLORIDE 5 MG: 5 INJECTION INTRAMUSCULAR; INTRAVENOUS at 08:41

## 2023-07-31 RX ADMIN — SODIUM CHLORIDE, POTASSIUM CHLORIDE, SODIUM LACTATE AND CALCIUM CHLORIDE 1000 ML: 600; 310; 30; 20 INJECTION, SOLUTION INTRAVENOUS at 08:32

## 2023-07-31 RX ADMIN — KETOROLAC TROMETHAMINE 10 MG: 15 INJECTION, SOLUTION INTRAMUSCULAR; INTRAVENOUS at 07:56

## 2023-07-31 RX ADMIN — ONDANSETRON 4 MG: 2 INJECTION INTRAMUSCULAR; INTRAVENOUS at 07:55

## 2023-07-31 RX ADMIN — LORAZEPAM 1 MG: 2 INJECTION INTRAMUSCULAR; INTRAVENOUS at 11:18

## 2023-07-31 RX ADMIN — LORAZEPAM 1 MG: 2 INJECTION INTRAMUSCULAR; INTRAVENOUS at 09:59

## 2023-07-31 RX ADMIN — SODIUM CHLORIDE 55 ML: 9 INJECTION, SOLUTION INTRAVENOUS at 08:18

## 2023-07-31 RX ADMIN — IOPAMIDOL 59 ML: 755 INJECTION, SOLUTION INTRAVENOUS at 08:18

## 2023-07-31 ASSESSMENT — ACTIVITIES OF DAILY LIVING (ADL)
ADLS_ACUITY_SCORE: 35
ADLS_ACUITY_SCORE: 35

## 2023-07-31 NOTE — ED NOTES
Patient calling out to desk states she is very restless and asking for more medication to help her settled down. MD rae

## 2023-07-31 NOTE — DISCHARGE INSTRUCTIONS
Let your bowels rest.  When you start to feel thirsty you can begin to drink fluids.  Start to feel hungry you can start to eat a bland, starchy diet--crackers, potatoes, rice, bread--and lots of liquids. Avoid dairy and heavy, greasy food until better.  Be seen if nausea and vomiting recurs, focal, persistent abdominal pain, fevers, bloody diarrhea, or other concerning symptoms.

## 2023-07-31 NOTE — ED TRIAGE NOTES
Pt woke up with mid to right abdominal pain @ 0300 today along with nausea and vomiting.      Triage Assessment       Row Name 07/31/23 0726       Triage Assessment (Adult)    Airway WDL WDL       Respiratory WDL    Respiratory WDL WDL       Cardiac WDL    Cardiac WDL WDL       Cognitive/Neuro/Behavioral WDL    Cognitive/Neuro/Behavioral WDL WDL

## 2023-07-31 NOTE — ED PROVIDER NOTES
History     Chief Complaint   Patient presents with    Abdominal Pain    Nausea & Vomiting     HPI  Rafaela Mckeon is a 22 year old female who presents with nausea vomiting and abdominal pain.  This woke her from sleep at 3 AM, 5 hours ago.  She had a little bit of loose stools with this.  Pain has migrated to the right lower quadrant.  She is not having any irritative voiding symptoms.  She is not short of breath.  She does not menstruate due to Nexplanon insert.  She had a prior cholecystectomy and no other abdominal surgeries.  She has not recently been on antibiotics or had exotic travel.    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 features (H) 03/02/2019     Priority: Medium    Abnormal hearing screen 10/09/2018     Priority: Medium    Suicidal ideation 02/02/2018     Priority: Medium    Generalized anxiety disorder 09/28/2017     Priority: Medium        Past Medical History:    Past Medical History:   Diagnosis Date    Foreign-Danlos syndrome     Postural orthostatic tachycardia syndrome        Past Surgical History:    Past Surgical History:   Procedure Laterality Date    CHOLECYSTECTOMY         Family History:    Family History   Problem Relation Age of Onset    Depression Mother     Allergies Mother         Seasonal    Depression Father     Anxiety Disorder Father     Anxiety Disorder Maternal Grandmother     Depression Maternal Grandmother      "Depression Sister     Allergies Sister         Seasonal    Schizophrenia Maternal Uncle        Social History:  Marital Status:  Single [1]  Social History     Tobacco Use    Smoking status: Never    Smokeless tobacco: Never   Substance Use Topics    Alcohol use: No    Drug use: No        Medications:    albuterol (PROAIR HFA/PROVENTIL HFA/VENTOLIN HFA) 108 (90 Base) MCG/ACT inhaler  benzoyl peroxide 5 % external liquid  ciclopirox (LOPROX) 0.77 % cream  clonazePAM (KLONOPIN) 0.5 MG tablet  doxycycline hyclate (VIBRAMYCIN) 50 MG capsule  etonogestrel (NEXPLANON) 68 MG IMPL  fluocinonide (LIDEX) 0.05 % external solution  FLUoxetine (PROZAC) 10 MG capsule  gabapentin (NEURONTIN) 300 MG capsule  hydrOXYzine (VISTARIL) 25 MG capsule  lamoTRIgine (LAMICTAL) 100 MG tablet  mirtazapine (REMERON) 15 MG tablet  prazosin (MINIPRESS) 5 MG capsule  QUEtiapine (SEROQUEL) 50 MG tablet  QUEtiapine ER (SEROQUEL XR) 400 MG 24 hr tablet  tretinoin (RETIN-A) 0.05 % external cream          Review of Systems  All other systems are reviewed and are negative    Physical Exam   BP: 104/65  Pulse: 100  Temp: 97.8  F (36.6  C)  Resp: 16  Height: 162.6 cm (5' 4\")  Weight: 55.8 kg (123 lb)  SpO2: 100 %      Physical Exam    Nursing note and vitals were reviewed.  Constitutional: Awake and alert, adequately nourished and developed appearing 22-year-old in moderate discomfort, who appears moderately ill but nontoxic,, and who answers questions appropriately and cooperates with examination.  HEENT: Voice quality is soft but otherwise intact.  EOMI. PERRL speech is fluent  Neck: Freely mobile.  Cardiovascular: Cardiac examination reveals normal heart rate and regular rhythm without murmur.  Pulmonary/Chest: Breathing is unlabored.  Breath sounds are clear and equal bilaterally.  There no retractions, tachypnea, rales, wheezes, or rhonchi.  Abdomen: Soft, tender in the right lower quadrant without peritoneal signs, no HSM or masses rebound or " guarding.  Musculoskeletal: Extremities are warm and well-perfused and without edema  Neurological: Alert, oriented, thought content logical, coherent   Skin: Warm, dry, no rashes.  Psychiatric: Affect sad.    ED Course                 Procedures              Critical Care time:  none               Results for orders placed or performed during the hospital encounter of 07/31/23 (from the past 24 hour(s))   [place] Draw *Canceled*    Narrative    The following orders were created for panel order [place] Draw.  Procedure                               Abnormality         Status                     ---------                               -----------         ------                       Please view results for these tests on the individual orders.   CBC with platelets differential    Narrative    The following orders were created for panel order CBC with platelets differential.  Procedure                               Abnormality         Status                     ---------                               -----------         ------                     CBC with platelets and d...[[id number]]  Abnormal            Final result                 Please view results for these tests on the individual orders.   Comprehensive metabolic panel   Result Value Ref Range    Sodium 139 136 - 145 mmol/L    Potassium 3.7 3.4 - 5.3 mmol/L    Chloride 102 98 - 107 mmol/L    Carbon Dioxide (CO2) 22 22 - 29 mmol/L    Anion Gap 15 7 - 15 mmol/L    Urea Nitrogen 10.7 6.0 - 20.0 mg/dL    Creatinine 0.63 0.51 - 0.95 mg/dL    Calcium 10.1 (H) 8.6 - 10.0 mg/dL    Glucose 109 (H) 70 - 99 mg/dL    Alkaline Phosphatase 89 35 - 104 U/L    AST 43 0 - 45 U/L    ALT 48 0 - 50 U/L    Protein Total 7.6 6.4 - 8.3 g/dL    Albumin 4.7 3.5 - 5.2 g/dL    Bilirubin Total 0.3 <=1.2 mg/dL    GFR Estimate >90 >60 mL/min/1.73m2   [place] Draw    Narrative    The following orders were created for panel order [place] Draw.  Procedure                                Abnormality         Status                     ---------                               -----------         ------                     Extra Red Top Tube[[id number]]                               Final result                 Please view results for these tests on the individual orders.   CBC with platelets and differential   Result Value Ref Range    WBC Count 12.5 (H) 4.0 - 11.0 10e3/uL    RBC Count 4.44 3.80 - 5.20 10e6/uL    Hemoglobin 13.1 11.7 - 15.7 g/dL    Hematocrit 40.4 35.0 - 47.0 %    MCV 91 78 - 100 fL    MCH 29.5 26.5 - 33.0 pg    MCHC 32.4 31.5 - 36.5 g/dL    RDW 13.3 10.0 - 15.0 %    Platelet Count 360 150 - 450 10e3/uL    % Neutrophils 89 %    % Lymphocytes 5 %    % Monocytes 6 %    % Eosinophils 0 %    % Basophils 0 %    % Immature Granulocytes 0 %    NRBCs per 100 WBC 0 <1 /100    Absolute Neutrophils 11.1 (H) 1.6 - 8.3 10e3/uL    Absolute Lymphocytes 0.6 (L) 0.8 - 5.3 10e3/uL    Absolute Monocytes 0.8 0.0 - 1.3 10e3/uL    Absolute Eosinophils 0.1 0.0 - 0.7 10e3/uL    Absolute Basophils 0.0 0.0 - 0.2 10e3/uL    Absolute Immature Granulocytes 0.0 <=0.4 10e3/uL    Absolute NRBCs 0.0 10e3/uL   Extra Red Top Tube   Result Value Ref Range    Hold Specimen [facility]    CT Abdomen Pelvis w Contrast    Narrative    CT ABDOMEN/PELVIS WITH CONTRAST July 31, 2023 8:30 AM    CLINICAL HISTORY: Right lower quadrant pain.    TECHNIQUE: CT scan of the abdomen and pelvis was performed following  injection of IV contrast. Multiplanar reformats were obtained. Dose  reduction techniques were used.  CONTRAST: 59mL Isovue-370.    COMPARISON: CT of the abdomen and pelvis performed 7/24/2022.    FINDINGS:   LOWER CHEST: The visualized lung bases are clear.    HEPATOBILIARY: Cholecystectomy. No hepatic masses.    PANCREAS: Normal.    SPLEEN: Normal.    ADRENAL GLANDS: Normal.    KIDNEYS/BLADDER: Unremarkable. No hydronephrosis.    BOWEL: No bowel obstruction. No convincing evidence for colitis or  diverticulitis. Unremarkable  appendix.    PELVIC ORGANS: Unremarkable. No free fluid in the pelvis.    LYMPH NODES: No enlarged lymph nodes are identified in the abdomen or  pelvis.    VASCULATURE: Unremarkable.    ADDITIONAL FINDINGS: None.    MUSCULOSKELETAL: Unremarkable.      Impression    IMPRESSION: No acute abnormality in the abdomen or pelvis. No cause  for abdominal pain is identified.    NEERU YOUNGER MD         SYSTEM ID:  [id number]       Medications   ondansetron (ZOFRAN) injection 4 mg (4 mg Intravenous $Given 7/31/23 0755)   LORazepam (ATIVAN) injection 1 mg (has no administration in time range)   ketorolac (TORADOL) injection 10 mg (10 mg Intravenous $Given 7/31/23 0756)   iopamidol (ISOVUE-370) solution 59 mL (59 mLs Intravenous $Given 7/31/23 0818)   sodium chloride 0.9 % bag 500mL for CT scan flush use (55 mLs Intravenous $Given 7/31/23 0818)   lactated ringers BOLUS 1,000 mL (1,000 mLs Intravenous $New Bag 7/31/23 0832)   metoclopramide (REGLAN) injection 5 mg (5 mg Intravenous $Given 7/31/23 0841)     9:30 AM: Patient reassessed.  She is feeling better but a bit restless after the Reglan.  We will treat the acathisia with lorazepam.  Her nausea is resolved.  Her pain is minimal.  Her lab results were reviewed which are all reassuring with normal blood chemistries and mild elevation of the white count which is nonspecific.  CT scan of the abdomen pelvis is normal.  Discussed with her that I suspect a viral gastroenteritis.  She can monitor her symptoms and adhere to a bland diet return if worse, vomiting, focal persistent abdominal pain, or other worrisome symptoms.  Assessments & Plan (with Medical Decision Making)     22-year-old female presented with nausea and vomiting with diffuse crampy abdominal pain as described above.  Vital signs were normal.  Physical examination showed no peritoneal irritation but right lower quadrant tenderness.  CT scan of the abdomen and pelvis was normal.  Nausea was controlled with Zofran  and Reglan but she developed some acathisia.  This was treated with lorazepam.  She received fluid hydration.  She felt symptomatically improved.  At this time I recommend no further work-up in the emergency department and treatment with bowel rest and gradual escalation of diet from liquids to a bland starchy diet.  She should return if she has focal persistent pain, recurrence of nausea and vomiting, fevers, or other worrisome symptoms.  She expressed understanding and her questions were answered.    I have reviewed the nursing notes.    I have reviewed the findings, diagnosis, plan and need for follow up with the patient.         New Prescriptions    No medications on file       Final diagnoses:   Nausea and vomiting, unspecified vomiting type   Generalized abdominal pain       7/31/2023   [facility] EMERGENCY DEPT       Sandro Aburto MD  07/31/23 7702

## 2023-11-25 ENCOUNTER — HEALTH MAINTENANCE LETTER (OUTPATIENT)
Age: 23
End: 2023-11-25

## 2024-04-07 ENCOUNTER — HOSPITAL ENCOUNTER (EMERGENCY)
Facility: CLINIC | Age: 24
Discharge: PSYCHIATRIC OR SUBSTANCE USE FACILITY | End: 2024-04-08
Attending: FAMILY MEDICINE | Admitting: FAMILY MEDICINE
Payer: COMMERCIAL

## 2024-04-07 DIAGNOSIS — F32.A DEPRESSION WITH SUICIDAL IDEATION: ICD-10-CM

## 2024-04-07 DIAGNOSIS — R45.851 DEPRESSION WITH SUICIDAL IDEATION: ICD-10-CM

## 2024-04-07 LAB
ALBUMIN SERPL BCG-MCNC: 3.9 G/DL (ref 3.5–5.2)
ALP SERPL-CCNC: 86 U/L (ref 40–150)
ALT SERPL W P-5'-P-CCNC: 48 U/L (ref 0–50)
ANION GAP SERPL CALCULATED.3IONS-SCNC: 11 MMOL/L (ref 7–15)
APAP SERPL-MCNC: <5 UG/ML (ref 10–30)
AST SERPL W P-5'-P-CCNC: 38 U/L (ref 0–45)
BASOPHILS # BLD AUTO: 0 10E3/UL (ref 0–0.2)
BASOPHILS NFR BLD AUTO: 0 %
BILIRUB SERPL-MCNC: <0.2 MG/DL
BUN SERPL-MCNC: 9.2 MG/DL (ref 6–20)
CALCIUM SERPL-MCNC: 8.7 MG/DL (ref 8.6–10)
CHLORIDE SERPL-SCNC: 105 MMOL/L (ref 98–107)
CREAT SERPL-MCNC: 0.57 MG/DL (ref 0.51–0.95)
DEPRECATED HCO3 PLAS-SCNC: 25 MMOL/L (ref 22–29)
EGFRCR SERPLBLD CKD-EPI 2021: >90 ML/MIN/1.73M2
EOSINOPHIL # BLD AUTO: 0 10E3/UL (ref 0–0.7)
EOSINOPHIL NFR BLD AUTO: 0 %
ERYTHROCYTE [DISTWIDTH] IN BLOOD BY AUTOMATED COUNT: 14.6 % (ref 10–15)
ETHANOL SERPL-MCNC: <0.01 G/DL
GLUCOSE SERPL-MCNC: 109 MG/DL (ref 70–99)
HCG SERPL QL: NEGATIVE
HCT VFR BLD AUTO: 31.3 % (ref 35–47)
HGB BLD-MCNC: 10.1 G/DL (ref 11.7–15.7)
IMM GRANULOCYTES # BLD: 0 10E3/UL
IMM GRANULOCYTES NFR BLD: 0 %
LIPASE SERPL-CCNC: 22 U/L (ref 13–60)
LYMPHOCYTES # BLD AUTO: 1.4 10E3/UL (ref 0.8–5.3)
LYMPHOCYTES NFR BLD AUTO: 23 %
MCH RBC QN AUTO: 29.1 PG (ref 26.5–33)
MCHC RBC AUTO-ENTMCNC: 32.3 G/DL (ref 31.5–36.5)
MCV RBC AUTO: 90 FL (ref 78–100)
MONOCYTES # BLD AUTO: 0.6 10E3/UL (ref 0–1.3)
MONOCYTES NFR BLD AUTO: 10 %
NEUTROPHILS # BLD AUTO: 3.9 10E3/UL (ref 1.6–8.3)
NEUTROPHILS NFR BLD AUTO: 66 %
NRBC # BLD AUTO: 0 10E3/UL
NRBC BLD AUTO-RTO: 0 /100
PLATELET # BLD AUTO: 265 10E3/UL (ref 150–450)
POTASSIUM SERPL-SCNC: 3.4 MMOL/L (ref 3.4–5.3)
PROT SERPL-MCNC: 6.3 G/DL (ref 6.4–8.3)
RBC # BLD AUTO: 3.47 10E6/UL (ref 3.8–5.2)
SALICYLATES SERPL-MCNC: <0.3 MG/DL
SODIUM SERPL-SCNC: 141 MMOL/L (ref 135–145)
TSH SERPL DL<=0.005 MIU/L-ACNC: 1.14 UIU/ML (ref 0.3–4.2)
WBC # BLD AUTO: 5.8 10E3/UL (ref 4–11)

## 2024-04-07 PROCEDURE — 36415 COLL VENOUS BLD VENIPUNCTURE: CPT | Performed by: FAMILY MEDICINE

## 2024-04-07 PROCEDURE — 83690 ASSAY OF LIPASE: CPT | Performed by: FAMILY MEDICINE

## 2024-04-07 PROCEDURE — 82077 ASSAY SPEC XCP UR&BREATH IA: CPT | Performed by: FAMILY MEDICINE

## 2024-04-07 PROCEDURE — 85004 AUTOMATED DIFF WBC COUNT: CPT | Performed by: FAMILY MEDICINE

## 2024-04-07 PROCEDURE — 80179 DRUG ASSAY SALICYLATE: CPT | Performed by: FAMILY MEDICINE

## 2024-04-07 PROCEDURE — 99285 EMERGENCY DEPT VISIT HI MDM: CPT | Performed by: FAMILY MEDICINE

## 2024-04-07 PROCEDURE — 80143 DRUG ASSAY ACETAMINOPHEN: CPT | Performed by: FAMILY MEDICINE

## 2024-04-07 PROCEDURE — 84443 ASSAY THYROID STIM HORMONE: CPT | Performed by: FAMILY MEDICINE

## 2024-04-07 PROCEDURE — 80053 COMPREHEN METABOLIC PANEL: CPT | Performed by: FAMILY MEDICINE

## 2024-04-07 PROCEDURE — 84703 CHORIONIC GONADOTROPIN ASSAY: CPT | Performed by: FAMILY MEDICINE

## 2024-04-07 ASSESSMENT — COLUMBIA-SUICIDE SEVERITY RATING SCALE - C-SSRS
1. IN THE PAST MONTH, HAVE YOU WISHED YOU WERE DEAD OR WISHED YOU COULD GO TO SLEEP AND NOT WAKE UP?: YES
4. HAVE YOU HAD THESE THOUGHTS AND HAD SOME INTENTION OF ACTING ON THEM?: NO
5. HAVE YOU STARTED TO WORK OUT OR WORKED OUT THE DETAILS OF HOW TO KILL YOURSELF? DO YOU INTEND TO CARRY OUT THIS PLAN?: YES
3. HAVE YOU BEEN THINKING ABOUT HOW YOU MIGHT KILL YOURSELF?: YES
6. HAVE YOU EVER DONE ANYTHING, STARTED TO DO ANYTHING, OR PREPARED TO DO ANYTHING TO END YOUR LIFE?: YES
2. HAVE YOU ACTUALLY HAD ANY THOUGHTS OF KILLING YOURSELF IN THE PAST MONTH?: YES

## 2024-04-07 ASSESSMENT — ACTIVITIES OF DAILY LIVING (ADL)
ADLS_ACUITY_SCORE: 33
ADLS_ACUITY_SCORE: 33

## 2024-04-08 ENCOUNTER — TELEPHONE (OUTPATIENT)
Dept: BEHAVIORAL HEALTH | Facility: CLINIC | Age: 24
End: 2024-04-08
Payer: COMMERCIAL

## 2024-04-08 VITALS
BODY MASS INDEX: 19.89 KG/M2 | RESPIRATION RATE: 18 BRPM | OXYGEN SATURATION: 99 % | TEMPERATURE: 98.7 F | HEART RATE: 83 BPM | HEIGHT: 64 IN | SYSTOLIC BLOOD PRESSURE: 108 MMHG | WEIGHT: 116.51 LBS | DIASTOLIC BLOOD PRESSURE: 67 MMHG

## 2024-04-08 PROBLEM — F43.10 POSTTRAUMATIC STRESS DISORDER: Status: ACTIVE | Noted: 2024-04-08

## 2024-04-08 PROBLEM — F25.1 SCHIZOAFFECTIVE DISORDER, DEPRESSIVE TYPE (H): Status: ACTIVE | Noted: 2022-08-08

## 2024-04-08 LAB
AMPHETAMINES UR QL SCN: NORMAL
BARBITURATES UR QL SCN: NORMAL
BENZODIAZ UR QL SCN: NORMAL
BZE UR QL SCN: NORMAL
CANNABINOIDS UR QL SCN: NORMAL
FENTANYL UR QL: NORMAL
OPIATES UR QL SCN: NORMAL
PCP QUAL URINE (ROCHE): NORMAL
SARS-COV-2 RNA RESP QL NAA+PROBE: NEGATIVE

## 2024-04-08 PROCEDURE — 80307 DRUG TEST PRSMV CHEM ANLYZR: CPT | Performed by: FAMILY MEDICINE

## 2024-04-08 PROCEDURE — 250N000013 HC RX MED GY IP 250 OP 250 PS 637: Performed by: EMERGENCY MEDICINE

## 2024-04-08 PROCEDURE — 87635 SARS-COV-2 COVID-19 AMP PRB: CPT | Performed by: EMERGENCY MEDICINE

## 2024-04-08 RX ORDER — ONDANSETRON 4 MG/1
4 TABLET, ORALLY DISINTEGRATING ORAL EVERY 6 HOURS PRN
Status: DISCONTINUED | OUTPATIENT
Start: 2024-04-08 | End: 2024-04-08 | Stop reason: HOSPADM

## 2024-04-08 RX ORDER — HYDROXYZINE HYDROCHLORIDE 25 MG/1
25 TABLET, FILM COATED ORAL 3 TIMES DAILY PRN
Status: DISCONTINUED | OUTPATIENT
Start: 2024-04-08 | End: 2024-04-08 | Stop reason: HOSPADM

## 2024-04-08 RX ORDER — PROPRANOLOL HYDROCHLORIDE 10 MG/1
1 TABLET ORAL 2 TIMES DAILY
COMMUNITY
Start: 2023-12-06

## 2024-04-08 RX ORDER — OMEPRAZOLE 40 MG/1
40 CAPSULE, DELAYED RELEASE ORAL DAILY
COMMUNITY

## 2024-04-08 RX ORDER — GARLIC 200 MG
1 TABLET ORAL DAILY
COMMUNITY

## 2024-04-08 RX ORDER — FLUOXETINE 40 MG/1
80 CAPSULE ORAL EVERY EVENING
COMMUNITY
Start: 2024-04-01

## 2024-04-08 RX ORDER — CLINDAMYCIN AND BENZOYL PEROXIDE 10; 50 MG/G; MG/G
GEL TOPICAL DAILY
COMMUNITY

## 2024-04-08 RX ORDER — LAMOTRIGINE 100 MG/1
100 TABLET ORAL DAILY
Status: DISCONTINUED | OUTPATIENT
Start: 2024-04-08 | End: 2024-04-08 | Stop reason: HOSPADM

## 2024-04-08 RX ORDER — GABAPENTIN 300 MG/1
300 CAPSULE ORAL 2 TIMES DAILY
Status: DISCONTINUED | OUTPATIENT
Start: 2024-04-08 | End: 2024-04-08 | Stop reason: HOSPADM

## 2024-04-08 RX ADMIN — GABAPENTIN 300 MG: 300 CAPSULE ORAL at 09:59

## 2024-04-08 RX ADMIN — LAMOTRIGINE 100 MG: 100 TABLET ORAL at 09:59

## 2024-04-08 RX ADMIN — HYDROXYZINE HYDROCHLORIDE 25 MG: 25 TABLET, FILM COATED ORAL at 11:39

## 2024-04-08 ASSESSMENT — ACTIVITIES OF DAILY LIVING (ADL)
ADLS_ACUITY_SCORE: 35

## 2024-04-08 NOTE — MEDICATION SCRIBE - ADMISSION MEDICATION HISTORY
Medication Scribe Admission Medication History    Admission medication history is complete. The information provided in this note is only as accurate as the sources available at the time of the update.    Information Source(s): Patient, Clinic records, and CareEverywhere/SureScripts via with patient in room and finished at desk    Pertinent Information: Has Nexplanon in now.      Changes made to PTA medication list:  Added: Fluoxetine 80 mg, Clindamycin-Benzoyl Peroxide, Metformin 500 mg, Propranolol 10 mg, Vitamin C Powder, Omeprazole 40 mg.  Deleted: Benzoyl Peroxide 5%, Ciclopirox 0.77%, Fluoxetine 10 mg, Mirtazapine 15 mg, Ondansetron 4 mg ODT, Quetiapine 50 mg and 400 mg, Tretinoin 0.05%.  Changed: Clonazepam from bid to daily.  Gabapentin from 900 mg daily to 300 mg bid.    Allergies reviewed with patient and updates made in EHR: yes, no change.6    Medication History Completed By: Chacha Holland 4/8/2024 9:18 AM    PTA Med List   Medication Sig Last Dose    albuterol (PROAIR HFA/PROVENTIL HFA/VENTOLIN HFA) 108 (90 Base) MCG/ACT inhaler Inhale 2-4 puffs into the lungs every 4 hours as needed for shortness of breath / dyspnea or wheezing Past Month at prn    Ascorbic Acid (VITAMIN C) POWD Take 1 Scoop by mouth daily Mixed in water Past Week at am    clindamycin-benzoyl peroxide (BENZACLIN) 1-5 % external gel Apply topically daily Past Week at pm    clonazePAM (KLONOPIN) 0.5 MG tablet Take 0.5 mg by mouth daily Past Week at pm    doxycycline hyclate (VIBRAMYCIN) 50 MG capsule Take 50 mg by mouth 2 times daily Past Month at didn't refill    etonogestrel (NEXPLANON) 68 MG IMPL Nexplanon 68 mg subdermal implant   Inject 1 implant by subcutaneous route. 4/8/2024 at cont.    fluocinonide (LIDEX) 0.05 % external solution Apply topically 2 times daily Past Week at pm    FLUoxetine (PROZAC) 40 MG capsule Take 80 mg by mouth every evening Past Week at pm    gabapentin (NEURONTIN) 300 MG capsule Take 300 mg by mouth 2  times daily 4/7/2024 at pm    hydrOXYzine (VISTARIL) 25 MG capsule Take 25 mg by mouth 3 times daily as needed for anxiety  4/7/2024 at once    lamoTRIgine (LAMICTAL) 100 MG tablet Take 100 mg by mouth daily 4/7/2024 at am    metFORMIN (GLUCOPHAGE) 500 MG tablet Take 500 mg by mouth 2 times daily (with meals) 4/7/2024 at pm    omeprazole (PRILOSEC) 40 MG DR capsule Take 40 mg by mouth daily 4/7/2024 at am    prazosin (MINIPRESS) 5 MG capsule Take 5 mg by mouth At Bedtime 4/7/2024 at hs    propranolol (INDERAL) 10 MG tablet Take 1 tablet by mouth 2 times daily 4/7/2024 at am

## 2024-04-08 NOTE — ED PROVIDER NOTES
Emergency Department Patient Sign-out       Brief HPI:  This is a 23 year old female signed out to me by Dr. Nick at the end of his shift at 6:00 AM.  See initial ED Provider note for details of the presentation.     Patient is medically cleared for admission to a Behavioral Health unit.      The patient is not on a hold.      The patient has not required medication for agitation.    Awaiting Behavioral Health Assessment (DEC)      Significant Events prior to my assuming care: Medically cleared for disposition pending DEC mental health evaluation, probable psychiatric inpatient care and stabilization/admission.      Exam:   Patient Vitals for the past 24 hrs:   BP Temp Temp src Pulse Resp SpO2   04/08/24 1300 108/67 98.7  F (37.1  C) -- 83 18 99 %   04/08/24 0248 100/61 98.6  F (37  C) Oral 84 18 99 %       ED RESULTS:   Results for orders placed or performed during the hospital encounter of 04/07/24 (from the past 24 hour(s))   Urine Drug Screen Panel     Status: Normal    Collection Time: 04/08/24 12:53 AM   Result Value Ref Range    Amphetamines Urine Screen Negative Screen Negative    Barbituates Urine Screen Negative Screen Negative    Benzodiazepine Urine Screen Negative Screen Negative    Cannabinoids Urine Screen Negative Screen Negative    Cocaine Urine Screen Negative Screen Negative    Fentanyl Qual Urine Screen Negative Screen Negative    Opiates Urine Screen Negative Screen Negative    PCP Urine Screen Negative Screen Negative   Asymptomatic COVID-19 Virus (Coronavirus) by PCR Nose     Status: Normal    Collection Time: 04/08/24  9:44 AM    Specimen: Nose; Swab   Result Value Ref Range    SARS CoV2 PCR Negative Negative    Narrative    Testing was performed using the Xpert Xpress SARS-CoV-2 Assay on the Cepheid Gene-Xpert Instrument Systems. Additional information about this Emergency Use Authorization (EUA) assay can be found via the Lab Guide. This test should be ordered for the detection of  SARS-CoV-2 in individuals who meet SARS-CoV-2 clinical and/or epidemiological criteria as well as from individuals without symptoms or other reasons to suspect COVID-19. Test performance for asymptomatic patients has only been established in anterior nasal swab specimens. This test is for in vitro diagnostic use under the FDA EUA for laboratories certified under CLIA to perform high complexity testing. This test has not been FDA cleared or approved. A negative result does not rule out the presence of PCR inhibitors in the specimen or target RNA concentration below the limit of detection for the assay. The possibility of a false negative should be considered if the patient's recent exposure or clinical presentation suggests COVID-19. This test was validated by the [facility]. This laboratory is certified under the Clinical Laboratory Improvement Amendments (CLIA) as qualified to perform high complexity laboratory testing.         ED MEDICATIONS:   Medications - No data to display      Impression:    ICD-10-CM    1. Depression with suicidal ideation  F32.A     R45.851           Plan:    Pending studies: none.      6:45 AM - I reviewed the case and consulted the DEC  who performed a mental health evaluation and recommended admission.  The DEC  reports that Jose has been accumulating pills/meds and contemplating an overdose on medications, and has a history of prior suicide attempt by ingestion of medication/pills and this resulted in an ICU admission due to the severity of her ingestion.  Will await a psychiatric bed to become available for transfer for psychiatric admission.    12:30 PM - She has been accepted to [facility] under MD Medina.      Wei Alvarado MD  04/08/24 [id number]

## 2024-04-08 NOTE — ED NOTES
"[facility] ED Mental Health Handoff Note:       Chief Complaint   Patient presents with    Suicidal         Medically stable for inpatient mental health admission: Yes.    Evaluated by mental health: Yes. The recommendation is for inpatient mental health treatment. Bed search in process    Safety concerns: At the time I received sign out, there were no safety concerns.    Hold Status:  Active Orders   N/A           Exam:   Patient Vitals for the past 24 hrs:   BP Temp Temp src Pulse Resp SpO2 Height Weight   04/08/24 0248 100/61 98.6  F (37  C) Oral 84 18 99 % -- --   04/07/24 2139 117/78 98.5  F (36.9  C) Oral 93 16 98 % 1.626 m (5' 4\") 52.9 kg (116 lb 8.2 oz)         ED Course:           There were no significant events during my shift.      Impression:    ICD-10-CM    1. Depression with suicidal ideation  F32.A     R45.851           Plan:    Awaiting inpatient mental health admission/transfer.      RESULTS:   Results for orders placed or performed during the hospital encounter of 04/07/24 (from the past 24 hour(s))   CBC with Platelets & Differential     Status: Abnormal    Collection Time: 04/07/24 10:25 PM    Narrative    The following orders were created for panel order CBC with Platelets & Differential.  Procedure                               Abnormality         Status                     ---------                               -----------         ------                     CBC with platelets and d...[[id number]]  Abnormal            Final result                 Please view results for these tests on the individual orders.   Comprehensive metabolic panel     Status: Abnormal    Collection Time: 04/07/24 10:25 PM   Result Value Ref Range    Sodium 141 135 - 145 mmol/L    Potassium 3.4 3.4 - 5.3 mmol/L    Carbon Dioxide (CO2) 25 22 - 29 mmol/L    Anion Gap 11 7 - 15 mmol/L    Urea Nitrogen 9.2 6.0 - 20.0 mg/dL    Creatinine 0.57 0.51 - 0.95 mg/dL    GFR Estimate >90 >60 mL/min/1.73m2    Calcium 8.7 8.6 - " 10.0 mg/dL    Chloride 105 98 - 107 mmol/L    Glucose 109 (H) 70 - 99 mg/dL    Alkaline Phosphatase 86 40 - 150 U/L    AST 38 0 - 45 U/L    ALT 48 0 - 50 U/L    Protein Total 6.3 (L) 6.4 - 8.3 g/dL    Albumin 3.9 3.5 - 5.2 g/dL    Bilirubin Total <0.2 <=1.2 mg/dL   Lipase     Status: Normal    Collection Time: 04/07/24 10:25 PM   Result Value Ref Range    Lipase 22 13 - 60 U/L   Ethyl Alcohol Level     Status: Normal    Collection Time: 04/07/24 10:25 PM   Result Value Ref Range    Alcohol ethyl <0.01 <=0.01 g/dL   Acetaminophen level     Status: Abnormal    Collection Time: 04/07/24 10:25 PM   Result Value Ref Range    Acetaminophen <5.0 (L) 10.0 - 30.0 ug/mL   Salicylate level     Status: Normal    Collection Time: 04/07/24 10:25 PM   Result Value Ref Range    Salicylate <0.3   mg/dL   hCG Qualitative Pregnancy     Status: Normal    Collection Time: 04/07/24 10:25 PM   Result Value Ref Range    hCG Serum Qualitative Negative Negative   TSH with free T4 reflex     Status: Normal    Collection Time: 04/07/24 10:25 PM   Result Value Ref Range    TSH 1.14 0.30 - 4.20 uIU/mL   CBC with platelets and differential     Status: Abnormal    Collection Time: 04/07/24 10:25 PM   Result Value Ref Range    WBC Count 5.8 4.0 - 11.0 10e3/uL    RBC Count 3.47 (L) 3.80 - 5.20 10e6/uL    Hemoglobin 10.1 (L) 11.7 - 15.7 g/dL    Hematocrit 31.3 (L) 35.0 - 47.0 %    MCV 90 78 - 100 fL    MCH 29.1 26.5 - 33.0 pg    MCHC 32.3 31.5 - 36.5 g/dL    RDW 14.6 10.0 - 15.0 %    Platelet Count 265 150 - 450 10e3/uL    % Neutrophils 66 %    % Lymphocytes 23 %    % Monocytes 10 %    % Eosinophils 0 %    % Basophils 0 %    % Immature Granulocytes 0 %    NRBCs per 100 WBC 0 <1 /100    Absolute Neutrophils 3.9 1.6 - 8.3 10e3/uL    Absolute Lymphocytes 1.4 0.8 - 5.3 10e3/uL    Absolute Monocytes 0.6 0.0 - 1.3 10e3/uL    Absolute Eosinophils 0.0 0.0 - 0.7 10e3/uL    Absolute Basophils 0.0 0.0 - 0.2 10e3/uL    Absolute Immature Granulocytes 0.0 <=0.4  10e3/uL    Absolute NRBCs 0.0 10e3/uL   Urine Drug Screen Panel     Status: Normal    Collection Time: 04/08/24 12:53 AM   Result Value Ref Range    Amphetamines Urine Screen Negative Screen Negative    Barbituates Urine Screen Negative Screen Negative    Benzodiazepine Urine Screen Negative Screen Negative    Cannabinoids Urine Screen Negative Screen Negative    Cocaine Urine Screen Negative Screen Negative    Fentanyl Qual Urine Screen Negative Screen Negative    Opiates Urine Screen Negative Screen Negative    PCP Urine Screen Negative Screen Negative             Martha Gonzalez, RN

## 2024-04-08 NOTE — ED TRIAGE NOTES
"Pt presents with mom for suicidal ideation with a plan. Pt has attempted suicide in the past. Has been hospitalized around 7 times in the past and has helped. Pt verbalized \"I have a lot of problems.\"     Pt sees a therapist once a week and takes prescribed medications. Pt reports taking those medications as prescribed.     Goal of care: inpatient admission   Triage Assessment (Adult)       Row Name 04/07/24 0146          Triage Assessment    Airway WDL WDL        Respiratory WDL    Respiratory WDL WDL        Skin Circulation/Temperature WDL    Skin Circulation/Temperature WDL WDL        Cardiac WDL    Cardiac WDL WDL        Peripheral/Neurovascular WDL    Peripheral Neurovascular WDL WDL        Cognitive/Neuro/Behavioral WDL    Cognitive/Neuro/Behavioral WDL WDL                     "
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
## 2024-04-08 NOTE — TELEPHONE ENCOUNTER
R:  PC/Adam    12:06 PM Nicole updated that Pt has been accepted to [facility] under MD Medina and nurse to nurse #[phone].    Updated worklist and added to the admit board    12:12 PM [facility]
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
## 2024-12-29 ENCOUNTER — HEALTH MAINTENANCE LETTER (OUTPATIENT)
Age: 24
End: 2024-12-29